# Patient Record
Sex: FEMALE | Race: WHITE | NOT HISPANIC OR LATINO | Employment: FULL TIME | URBAN - METROPOLITAN AREA
[De-identification: names, ages, dates, MRNs, and addresses within clinical notes are randomized per-mention and may not be internally consistent; named-entity substitution may affect disease eponyms.]

---

## 2023-04-03 ENCOUNTER — TELEPHONE (OUTPATIENT)
Dept: PRIMARY CARE | Facility: CLINIC | Age: 63
End: 2023-04-03
Payer: COMMERCIAL

## 2023-04-03 DIAGNOSIS — N30.00 ACUTE CYSTITIS WITHOUT HEMATURIA: Primary | ICD-10-CM

## 2023-04-03 RX ORDER — SULFAMETHOXAZOLE AND TRIMETHOPRIM 800; 160 MG/1; MG/1
1 TABLET ORAL 2 TIMES DAILY
Qty: 20 TABLET | Refills: 0 | Status: SHIPPED | OUTPATIENT
Start: 2023-04-03 | End: 2023-04-13

## 2023-04-03 NOTE — TELEPHONE ENCOUNTER
Having UTI symptoms of burning, urgency with urination, tried to go to urgent care yesterday, they were too full to see her, she went and got an Azo kit and it tested positive for Leukocytes and Nitrites and its helping calm it down but not taking the pain away and her urine is cloudy.. She had to go into work today and would like a script sent to the pharmacy so she can get started on this. Please review and advise, thank you

## 2023-06-28 DIAGNOSIS — Z86.19 HISTORY OF COLD SORES: Primary | ICD-10-CM

## 2023-06-28 PROBLEM — M25.529 JOINT PAIN, ELBOW: Status: RESOLVED | Noted: 2023-06-28 | Resolved: 2023-06-28

## 2023-06-28 PROBLEM — M48.02 STENOSIS, CERVICAL SPINE: Status: ACTIVE | Noted: 2023-06-28

## 2023-06-28 PROBLEM — K29.00 ACUTE GASTRITIS: Status: RESOLVED | Noted: 2023-06-28 | Resolved: 2023-06-28

## 2023-06-28 PROBLEM — M77.00 MEDIAL EPICONDYLITIS OF ELBOW: Status: ACTIVE | Noted: 2023-06-28

## 2023-06-28 PROBLEM — T63.481A ALLERGIC REACTION TO INSECT STING: Status: RESOLVED | Noted: 2023-06-28 | Resolved: 2023-06-28

## 2023-06-28 PROBLEM — G44.309 POST-CONCUSSION HEADACHE: Status: RESOLVED | Noted: 2023-06-28 | Resolved: 2023-06-28

## 2023-06-28 PROBLEM — R30.0 BURNING WITH URINATION: Status: RESOLVED | Noted: 2023-06-28 | Resolved: 2023-06-28

## 2023-06-28 PROBLEM — S76.019A STRAIN OF FLEXOR MUSCLE OF HIP: Status: RESOLVED | Noted: 2023-06-28 | Resolved: 2023-06-28

## 2023-06-28 PROBLEM — M76.01 GLUTEAL TENDINITIS OF RIGHT BUTTOCK: Status: RESOLVED | Noted: 2023-06-28 | Resolved: 2023-06-28

## 2023-06-28 PROBLEM — M79.89 FINGER SWELLING: Status: RESOLVED | Noted: 2023-06-28 | Resolved: 2023-06-28

## 2023-06-28 PROBLEM — H52.4 BILATERAL PRESBYOPIA: Status: ACTIVE | Noted: 2023-06-28

## 2023-06-28 PROBLEM — R10.9 ABDOMINAL PAIN: Status: RESOLVED | Noted: 2023-06-28 | Resolved: 2023-06-28

## 2023-06-28 PROBLEM — M77.11 LATERAL EPICONDYLITIS OF RIGHT ELBOW: Status: RESOLVED | Noted: 2023-06-28 | Resolved: 2023-06-28

## 2023-06-28 PROBLEM — J01.90 SINUSITIS, ACUTE: Status: RESOLVED | Noted: 2023-06-28 | Resolved: 2023-06-28

## 2023-06-28 PROBLEM — L20.9 ATOPIC DERMATITIS: Status: RESOLVED | Noted: 2023-06-28 | Resolved: 2023-06-28

## 2023-06-28 PROBLEM — R94.39 ABNORMAL STRESS ECHOCARDIOGRAM: Status: ACTIVE | Noted: 2023-06-28

## 2023-06-28 PROBLEM — J11.1 INFLUENZA-LIKE SYNDROME: Status: RESOLVED | Noted: 2023-06-28 | Resolved: 2023-06-28

## 2023-06-28 PROBLEM — M77.00 MEDIAL EPICONDYLITIS OF ELBOW: Status: RESOLVED | Noted: 2023-06-28 | Resolved: 2023-06-28

## 2023-06-28 PROBLEM — H52.03 HYPEROPIA OF BOTH EYES: Status: RESOLVED | Noted: 2023-06-28 | Resolved: 2023-06-28

## 2023-06-28 PROBLEM — N31.8 HYPERACTIVITY OF BLADDER: Status: ACTIVE | Noted: 2023-06-28

## 2023-06-28 PROBLEM — M25.551 HIP PAIN, RIGHT: Status: RESOLVED | Noted: 2023-06-28 | Resolved: 2023-06-28

## 2023-06-28 PROBLEM — H02.836 DERMATOCHALASIS OF EYELID OF LEFT EYE: Status: RESOLVED | Noted: 2023-06-28 | Resolved: 2023-06-28

## 2023-06-28 PROBLEM — S06.0XAA CONCUSSION: Status: RESOLVED | Noted: 2023-06-28 | Resolved: 2023-06-28

## 2023-06-28 PROBLEM — M70.61 TROCHANTERIC BURSITIS OF RIGHT HIP: Status: RESOLVED | Noted: 2023-06-28 | Resolved: 2023-06-28

## 2023-06-28 PROBLEM — J20.9 ACUTE BRONCHITIS: Status: RESOLVED | Noted: 2023-06-28 | Resolved: 2023-06-28

## 2023-06-28 PROBLEM — H25.13 CATARACT, NUCLEAR SCLEROTIC, BOTH EYES: Status: ACTIVE | Noted: 2023-06-28

## 2023-06-28 PROBLEM — S50.00XA ELBOW CONTUSION: Status: RESOLVED | Noted: 2023-06-28 | Resolved: 2023-06-28

## 2023-06-28 PROBLEM — R07.89 ATYPICAL CHEST PAIN: Status: ACTIVE | Noted: 2023-06-28

## 2023-06-28 PROBLEM — H81.11 BENIGN PAROXYSMAL POSITIONAL VERTIGO OF RIGHT EAR: Status: RESOLVED | Noted: 2023-06-28 | Resolved: 2023-06-28

## 2023-06-28 PROBLEM — R09.89 LABILE BLOOD PRESSURE: Status: ACTIVE | Noted: 2023-06-28

## 2023-06-28 PROBLEM — H04.123 DRY EYE SYNDROME OF BOTH LACRIMAL GLANDS: Status: ACTIVE | Noted: 2023-06-28

## 2023-06-28 PROBLEM — M54.50 ACUTE BILATERAL LOW BACK PAIN WITHOUT SCIATICA: Status: RESOLVED | Noted: 2023-06-28 | Resolved: 2023-06-28

## 2023-06-28 PROBLEM — R05.3 CHRONIC COUGH: Status: RESOLVED | Noted: 2023-06-28 | Resolved: 2023-06-28

## 2023-06-28 PROBLEM — J45.909 ASTHMATIC BRONCHITIS (HHS-HCC): Status: RESOLVED | Noted: 2023-06-28 | Resolved: 2023-06-28

## 2023-06-28 PROBLEM — J30.9 ALLERGIC RHINITIS: Status: ACTIVE | Noted: 2023-06-28

## 2023-06-28 PROBLEM — K63.5 COLON POLYPS: Status: ACTIVE | Noted: 2023-06-28

## 2023-06-28 PROBLEM — K52.9 GASTROENTERITIS: Status: RESOLVED | Noted: 2023-06-28 | Resolved: 2023-06-28

## 2023-06-28 PROBLEM — R19.7 DIARRHEA: Status: RESOLVED | Noted: 2023-06-28 | Resolved: 2023-06-28

## 2023-06-28 PROBLEM — N39.0 URINARY TRACT INFECTION: Status: RESOLVED | Noted: 2023-06-28 | Resolved: 2023-06-28

## 2023-06-28 PROBLEM — R07.81 RIB PAIN ON LEFT SIDE: Status: RESOLVED | Noted: 2023-06-28 | Resolved: 2023-06-28

## 2023-06-28 PROBLEM — H81.90 VESTIBULAR DISORDER: Status: ACTIVE | Noted: 2023-06-28

## 2023-06-28 PROBLEM — L72.3 SEBACEOUS CYST: Status: ACTIVE | Noted: 2023-06-28

## 2023-06-28 PROBLEM — G57.01 PIRIFORMIS SYNDROME OF RIGHT SIDE: Status: RESOLVED | Noted: 2023-06-28 | Resolved: 2023-06-28

## 2023-06-28 PROBLEM — J06.9 ACUTE UPPER RESPIRATORY INFECTION: Status: RESOLVED | Noted: 2023-06-28 | Resolved: 2023-06-28

## 2023-06-28 PROBLEM — A04.72 C. DIFFICILE COLITIS: Status: RESOLVED | Noted: 2023-06-28 | Resolved: 2023-06-28

## 2023-06-28 PROBLEM — M85.80 OSTEOPENIA: Status: ACTIVE | Noted: 2023-06-28

## 2023-06-28 PROBLEM — K56.7 ILEUS (MULTI): Status: ACTIVE | Noted: 2023-06-28

## 2023-06-28 PROBLEM — M47.812 CERVICAL ARTHRITIS: Status: ACTIVE | Noted: 2023-06-28

## 2023-06-28 PROBLEM — I25.10 CORONARY ARTERY ARTERIOSCLEROSIS: Status: ACTIVE | Noted: 2023-06-28

## 2023-06-28 PROBLEM — K59.00 CONSTIPATION: Status: ACTIVE | Noted: 2023-06-28

## 2023-06-28 RX ORDER — VALACYCLOVIR HYDROCHLORIDE 500 MG/1
1 TABLET, FILM COATED ORAL DAILY
COMMUNITY
Start: 2013-05-02 | End: 2023-06-28 | Stop reason: SDUPTHER

## 2023-06-29 RX ORDER — VALACYCLOVIR HYDROCHLORIDE 500 MG/1
500 TABLET, FILM COATED ORAL DAILY
Qty: 90 TABLET | Refills: 1 | Status: SHIPPED | OUTPATIENT
Start: 2023-06-29 | End: 2023-06-29

## 2023-10-02 ENCOUNTER — LAB (OUTPATIENT)
Dept: LAB | Facility: LAB | Age: 63
End: 2023-10-02
Payer: COMMERCIAL

## 2023-10-02 ENCOUNTER — OFFICE VISIT (OUTPATIENT)
Dept: PRIMARY CARE | Facility: CLINIC | Age: 63
End: 2023-10-02
Payer: COMMERCIAL

## 2023-10-02 ENCOUNTER — PHARMACY VISIT (OUTPATIENT)
Dept: PHARMACY | Facility: CLINIC | Age: 63
End: 2023-10-02
Payer: COMMERCIAL

## 2023-10-02 DIAGNOSIS — A09 DIARRHEA OF INFECTIOUS ORIGIN: ICD-10-CM

## 2023-10-02 DIAGNOSIS — A09 DIARRHEA OF INFECTIOUS ORIGIN: Primary | ICD-10-CM

## 2023-10-02 LAB
ALBUMIN SERPL BCP-MCNC: 4.1 G/DL (ref 3.4–5)
ALP SERPL-CCNC: 74 U/L (ref 33–136)
ALT SERPL W P-5'-P-CCNC: 9 U/L (ref 7–45)
ANION GAP SERPL CALC-SCNC: 15 MMOL/L (ref 10–20)
AST SERPL W P-5'-P-CCNC: 11 U/L (ref 9–39)
BASOPHILS # BLD AUTO: 0.05 X10*3/UL (ref 0–0.1)
BASOPHILS NFR BLD AUTO: 0.5 %
BILIRUB SERPL-MCNC: 0.4 MG/DL (ref 0–1.2)
BUN SERPL-MCNC: 11 MG/DL (ref 6–23)
CALCIUM SERPL-MCNC: 9.1 MG/DL (ref 8.6–10.6)
CHLORIDE SERPL-SCNC: 101 MMOL/L (ref 98–107)
CO2 SERPL-SCNC: 28 MMOL/L (ref 21–32)
CREAT SERPL-MCNC: 0.71 MG/DL (ref 0.5–1.05)
EOSINOPHIL # BLD AUTO: 0.16 X10*3/UL (ref 0–0.7)
EOSINOPHIL NFR BLD AUTO: 1.5 %
ERYTHROCYTE [DISTWIDTH] IN BLOOD BY AUTOMATED COUNT: 11.6 % (ref 11.5–14.5)
GFR SERPL CREATININE-BSD FRML MDRD: >90 ML/MIN/1.73M*2
GLUCOSE SERPL-MCNC: 88 MG/DL (ref 74–99)
HCT VFR BLD AUTO: 42 % (ref 36–46)
HGB BLD-MCNC: 14.3 G/DL (ref 12–16)
IMM GRANULOCYTES # BLD AUTO: 0.04 X10*3/UL (ref 0–0.7)
IMM GRANULOCYTES NFR BLD AUTO: 0.4 % (ref 0–0.9)
LYMPHOCYTES # BLD AUTO: 2.03 X10*3/UL (ref 1.2–4.8)
LYMPHOCYTES NFR BLD AUTO: 19.2 %
MCH RBC QN AUTO: 33 PG (ref 26–34)
MCHC RBC AUTO-ENTMCNC: 34 G/DL (ref 32–36)
MCV RBC AUTO: 97 FL (ref 80–100)
MONOCYTES # BLD AUTO: 1.32 X10*3/UL (ref 0.1–1)
MONOCYTES NFR BLD AUTO: 12.5 %
NEUTROPHILS # BLD AUTO: 6.95 X10*3/UL (ref 1.2–7.7)
NEUTROPHILS NFR BLD AUTO: 65.9 %
NRBC BLD-RTO: 0 /100 WBCS (ref 0–0)
PLATELET # BLD AUTO: 305 X10*3/UL (ref 150–450)
PMV BLD AUTO: 9.8 FL (ref 7.5–11.5)
POTASSIUM SERPL-SCNC: 3.6 MMOL/L (ref 3.5–5.3)
PROT SERPL-MCNC: 6.5 G/DL (ref 6.4–8.2)
RBC # BLD AUTO: 4.33 X10*6/UL (ref 4–5.2)
SODIUM SERPL-SCNC: 140 MMOL/L (ref 136–145)
WBC # BLD AUTO: 10.6 X10*3/UL (ref 4.4–11.3)

## 2023-10-02 PROCEDURE — 99215 OFFICE O/P EST HI 40 MIN: CPT | Performed by: INTERNAL MEDICINE

## 2023-10-02 PROCEDURE — RXMED WILLOW AMBULATORY MEDICATION CHARGE

## 2023-10-02 PROCEDURE — 87635 SARS-COV-2 COVID-19 AMP PRB: CPT

## 2023-10-02 PROCEDURE — 85025 COMPLETE CBC W/AUTO DIFF WBC: CPT

## 2023-10-02 PROCEDURE — 36415 COLL VENOUS BLD VENIPUNCTURE: CPT

## 2023-10-02 PROCEDURE — 80053 COMPREHEN METABOLIC PANEL: CPT

## 2023-10-02 PROCEDURE — 1036F TOBACCO NON-USER: CPT | Performed by: INTERNAL MEDICINE

## 2023-10-02 RX ORDER — VANCOMYCIN HYDROCHLORIDE 125 MG/1
125 CAPSULE ORAL 4 TIMES DAILY
Qty: 40 CAPSULE | Refills: 0 | Status: SHIPPED | OUTPATIENT
Start: 2023-10-02 | End: 2023-10-02 | Stop reason: SDUPTHER

## 2023-10-02 RX ORDER — VANCOMYCIN HYDROCHLORIDE 125 MG/1
125 CAPSULE ORAL 4 TIMES DAILY
Qty: 40 CAPSULE | Refills: 0 | Status: SHIPPED | OUTPATIENT
Start: 2023-10-02 | End: 2023-10-12

## 2023-10-02 NOTE — PROGRESS NOTES
Subjective   Patient ID: Crystal Hall is a 63 y.o. female who presents for Diarrhea.  Diarrhea   Pertinent negatives include no abdominal pain, arthralgias, fever or vomiting.     Covid 19 +   Stool mucous frequent Q 2-4 hours   Pepto bismol   Probiotic   Recent sinus/upper respiratory infection treated with antibiotics  Past C. difficile infection  No abdominal pain although intermittent crampy   Aches diffuse  No fever or emesis ; nausea +      Review of Systems   Constitutional:  Positive for appetite change and fatigue. Negative for fever and unexpected weight change.   HENT:  Negative for rhinorrhea, sore throat, trouble swallowing and voice change.    Respiratory: Negative.     Cardiovascular: Negative.    Gastrointestinal:  Positive for diarrhea and nausea. Negative for abdominal distention, abdominal pain, blood in stool, constipation and vomiting.   Genitourinary:  Negative for dysuria and hematuria.   Musculoskeletal:  Negative for arthralgias.       Objective   Physical Exam  Constitutional:       General: She is not in acute distress.     Appearance: She is ill-appearing.   HENT:      Mouth/Throat:      Mouth: Mucous membranes are moist.      Pharynx: Oropharynx is clear. No posterior oropharyngeal erythema.   Eyes:      General: No scleral icterus.  Cardiovascular:      Rate and Rhythm: Normal rate and regular rhythm.      Heart sounds: Normal heart sounds.   Pulmonary:      Effort: Pulmonary effort is normal.      Breath sounds: Normal breath sounds.   Abdominal:      General: Abdomen is flat. Bowel sounds are normal. There is no distension.      Palpations: Abdomen is soft. There is no mass.      Tenderness: There is abdominal tenderness. There is no right CVA tenderness, left CVA tenderness, guarding or rebound.      Hernia: No hernia is present.   Musculoskeletal:      Cervical back: Neck supple.   Lymphadenopathy:      Cervical: No cervical adenopathy.   Neurological:      General: No focal  deficit present.      Mental Status: She is alert.      Gait: Gait normal.     /74   Pulse 84   Temp 36.8 °C (98.2 °F)   Resp 16       Assessment/Plan     Diarrhea likely of infectious origin  Potential for COVID-19 infection or recurrent C. difficile colitis following recent treatment for sinusitis with antibiotics  Test for both  Start antibiotic therapy vancomycin if appropriate for C. difficile  Symptomatic relief reviewed with antimotility agent, adequate fluid intake, fiber supplements  Off-duty for 1 week  Problem List Items Addressed This Visit    None  Visit Diagnoses       Diarrhea of infectious origin    -  Primary    Relevant Orders    Sars-CoV-2 PCR, Symptomatic (Completed)    C. difficile, PCR    CBC and Auto Differential (Completed)    Comprehensive metabolic panel (Completed)

## 2023-10-03 ENCOUNTER — TELEPHONE (OUTPATIENT)
Dept: PRIMARY CARE | Facility: CLINIC | Age: 63
End: 2023-10-03
Payer: COMMERCIAL

## 2023-10-03 DIAGNOSIS — U07.1 COVID-19 VIRUS INFECTION: Primary | ICD-10-CM

## 2023-10-03 LAB — SARS-COV-2 RNA RESP QL NAA+PROBE: DETECTED

## 2023-10-03 RX ORDER — LORATADINE AND PSEUDOEPHEDRINE SULFATE 5; 120 MG/1; MG/1
1 TABLET, EXTENDED RELEASE ORAL EVERY 12 HOURS PRN
COMMUNITY
Start: 2015-01-13

## 2023-10-03 RX ORDER — DOXYCYCLINE 100 MG/1
100 CAPSULE ORAL EVERY 12 HOURS
COMMUNITY
Start: 2022-07-15 | End: 2024-03-28 | Stop reason: ALTCHOICE

## 2023-10-03 RX ORDER — METRONIDAZOLE 500 MG/1
500 TABLET ORAL 3 TIMES DAILY
COMMUNITY
Start: 2022-01-28 | End: 2024-03-28 | Stop reason: ALTCHOICE

## 2023-10-03 RX ORDER — CRISABOROLE 20 MG/G
1 OINTMENT TOPICAL 2 TIMES DAILY
COMMUNITY
Start: 2018-05-17 | End: 2024-03-28 | Stop reason: ALTCHOICE

## 2023-10-03 RX ORDER — SULFAMETHOXAZOLE AND TRIMETHOPRIM 800; 160 MG/1; MG/1
1 TABLET ORAL 2 TIMES DAILY
COMMUNITY
Start: 2023-04-03

## 2023-10-03 NOTE — TELEPHONE ENCOUNTER
After speaking with her she stated that her diarrhea ceased after she left [facility] yesterday and has not started the vancomyocin yet and had not have the samples she would need for the c diff test. She does have the vanco but now unsure she should take it because of no diarrhea. She was able to eat some things last night She saw the result and the need for the paxlovid but she is also worried as to when she can return to work as she has had Covid over 10 days agao and isn't sure if thei is a rebound thing. Please advise, on what to do going forward, thankyou

## 2023-10-22 VITALS
DIASTOLIC BLOOD PRESSURE: 74 MMHG | HEART RATE: 84 BPM | TEMPERATURE: 98.2 F | RESPIRATION RATE: 16 BRPM | SYSTOLIC BLOOD PRESSURE: 116 MMHG

## 2023-10-22 ASSESSMENT — ENCOUNTER SYMPTOMS
FATIGUE: 1
VOMITING: 0
UNEXPECTED WEIGHT CHANGE: 0
NAUSEA: 1
ABDOMINAL PAIN: 0
DYSURIA: 0
BLOOD IN STOOL: 0
FEVER: 0
APPETITE CHANGE: 1
CONSTIPATION: 0
DIARRHEA: 1
VOICE CHANGE: 0
CARDIOVASCULAR NEGATIVE: 1
SORE THROAT: 0
ABDOMINAL DISTENTION: 0
TROUBLE SWALLOWING: 0
RHINORRHEA: 0
RESPIRATORY NEGATIVE: 1
HEMATURIA: 0
ARTHRALGIAS: 0

## 2023-12-19 DIAGNOSIS — N31.8 HYPERACTIVITY OF BLADDER: Primary | ICD-10-CM

## 2023-12-19 DIAGNOSIS — J30.9 ALLERGIC RHINITIS, UNSPECIFIED SEASONALITY, UNSPECIFIED TRIGGER: ICD-10-CM

## 2023-12-19 DIAGNOSIS — Z86.19 HISTORY OF COLD SORES: ICD-10-CM

## 2023-12-19 PROCEDURE — RXMED WILLOW AMBULATORY MEDICATION CHARGE

## 2023-12-19 RX ORDER — TOLTERODINE 4 MG/1
4 CAPSULE, EXTENDED RELEASE ORAL DAILY
Qty: 90 CAPSULE | Refills: 3 | Status: SHIPPED | OUTPATIENT
Start: 2023-12-19 | End: 2024-12-17

## 2023-12-19 RX ORDER — VALACYCLOVIR HYDROCHLORIDE 500 MG/1
500 TABLET, FILM COATED ORAL
Qty: 90 TABLET | Refills: 1 | Status: SHIPPED | OUTPATIENT
Start: 2023-12-19 | End: 2024-05-20 | Stop reason: SDUPTHER

## 2023-12-19 RX ORDER — MONTELUKAST SODIUM 10 MG/1
10 TABLET ORAL DAILY
Qty: 90 TABLET | Refills: 3 | Status: SHIPPED | OUTPATIENT
Start: 2023-12-19 | End: 2024-12-17

## 2023-12-20 PROCEDURE — RXMED WILLOW AMBULATORY MEDICATION CHARGE

## 2023-12-21 ENCOUNTER — PHARMACY VISIT (OUTPATIENT)
Dept: PHARMACY | Facility: CLINIC | Age: 63
End: 2023-12-21
Payer: COMMERCIAL

## 2024-03-14 PROCEDURE — RXMED WILLOW AMBULATORY MEDICATION CHARGE

## 2024-03-15 ENCOUNTER — PHARMACY VISIT (OUTPATIENT)
Dept: PHARMACY | Facility: CLINIC | Age: 64
End: 2024-03-15
Payer: COMMERCIAL

## 2024-03-27 ENCOUNTER — OFFICE VISIT (OUTPATIENT)
Dept: PRIMARY CARE | Facility: CLINIC | Age: 64
End: 2024-03-27
Payer: COMMERCIAL

## 2024-03-27 ENCOUNTER — PATIENT MESSAGE (OUTPATIENT)
Dept: PRIMARY CARE | Facility: CLINIC | Age: 64
End: 2024-03-27

## 2024-03-27 DIAGNOSIS — J45.20: Primary | ICD-10-CM

## 2024-03-27 PROCEDURE — 99214 OFFICE O/P EST MOD 30 MIN: CPT | Performed by: INTERNAL MEDICINE

## 2024-03-27 ASSESSMENT — ENCOUNTER SYMPTOMS
SPUTUM PRODUCTION: 1
RHINORRHEA: 1
ORTHOPNEA: 1
SHORTNESS OF BREATH: 1
WHEEZING: 1

## 2024-03-27 NOTE — PROGRESS NOTES
Subjective   Patient ID: Crystal Hall is a 63 y.o. female who presents for Cough.  Shortness of Breath  This is a recurrent problem. The current episode started 1 to 4 weeks ago. The problem occurs daily. The problem has been waxing and waning. The average episode lasts 1 hours. Associated symptoms include orthopnea, rhinorrhea, sputum production and wheezing. Pertinent negatives include no sore throat. The symptoms are aggravated by smoke, exercise, URIs, weather changes and eating.     Cough postnasal drip allergy med C-D Flonase   Wheezing, chest congestion  Align probiotic bowel habits stable  No swallowing chewing   Cold symptoms  Fever none   Shortness of breath 1 week  advair some help     Review of Systems   HENT:  Positive for rhinorrhea. Negative for sore throat, trouble swallowing and voice change.    Respiratory:  Positive for sputum production, shortness of breath and wheezing.    Cardiovascular:  Positive for orthopnea.   Gastrointestinal:  Negative for diarrhea.       Objective   Physical Exam  Constitutional:       General: She is not in acute distress.  HENT:      Nose: Congestion present.      Mouth/Throat:      Pharynx: Oropharynx is clear.   Neck:      Comments: No JVD or accessory muscle respiration use  Cardiovascular:      Rate and Rhythm: Normal rate and regular rhythm.      Pulses: Normal pulses.      Heart sounds: Normal heart sounds.   Pulmonary:      Effort: Pulmonary effort is normal.      Breath sounds: Rhonchi present. No wheezing or rales.      Comments: Occasional rhonchi  Musculoskeletal:      Right lower leg: No edema.      Left lower leg: No edema.   Lymphadenopathy:      Cervical: No cervical adenopathy.   Neurological:      Mental Status: She is alert.      Gait: Gait normal.       /74   Pulse 76   Resp 16     Data  Chest x-ray 9/9/2022 no acute disease  COVID-19 + 10/2/2023    Assessment/Plan     Reactive airways disease with cough/bronchospasm  Reviewed diagnosis  and therapeutic options  Recommend albuterol metered-dose inhaler for short-term relief bronchospasm and short course oral steroid therapy for anti-inflammatory effect  Prior history of COVID-19 infection  Prior C. difficile infection noted, defer empiric antibiotic therapy  Problem List Items Addressed This Visit    None  Visit Diagnoses       Reactive airway disease with wheezing, mild intermittent, uncomplicated ([facility])    -  Primary    Relevant Medications    albuterol (Ventolin HFA) 90 mcg/actuation inhaler    methylPREDNISolone (Medrol Dospak) 4 mg tablets

## 2024-03-28 ENCOUNTER — PHARMACY VISIT (OUTPATIENT)
Dept: PHARMACY | Facility: CLINIC | Age: 64
End: 2024-03-28
Payer: COMMERCIAL

## 2024-03-28 ENCOUNTER — PATIENT MESSAGE (OUTPATIENT)
Dept: PRIMARY CARE | Facility: CLINIC | Age: 64
End: 2024-03-28
Payer: COMMERCIAL

## 2024-03-28 DIAGNOSIS — J45.41 MODERATE PERSISTENT ASTHMATIC BRONCHITIS WITH ACUTE EXACERBATION (HHS-HCC): Primary | ICD-10-CM

## 2024-03-28 PROCEDURE — RXMED WILLOW AMBULATORY MEDICATION CHARGE

## 2024-03-28 RX ORDER — ALBUTEROL SULFATE 90 UG/1
AEROSOL, METERED RESPIRATORY (INHALATION)
Qty: 18 G | Refills: 0 | Status: SHIPPED | OUTPATIENT
Start: 2024-03-28 | End: 2024-05-08 | Stop reason: SDUPTHER

## 2024-03-28 RX ORDER — PREDNISONE 10 MG/1
TABLET ORAL
Qty: 42 TABLET | Refills: 0 | Status: SHIPPED | OUTPATIENT
Start: 2024-03-28 | End: 2024-04-09

## 2024-04-13 VITALS — DIASTOLIC BLOOD PRESSURE: 74 MMHG | SYSTOLIC BLOOD PRESSURE: 116 MMHG | HEART RATE: 76 BPM | RESPIRATION RATE: 16 BRPM

## 2024-04-13 RX ORDER — METHYLPREDNISOLONE 4 MG/1
TABLET ORAL
Qty: 21 TABLET | Refills: 0 | Status: SHIPPED | OUTPATIENT
Start: 2024-04-13

## 2024-04-13 RX ORDER — ALBUTEROL SULFATE 90 UG/1
2 AEROSOL, METERED RESPIRATORY (INHALATION) EVERY 4 HOURS PRN
Qty: 8 G | Refills: 5 | Status: SHIPPED | OUTPATIENT
Start: 2024-04-13 | End: 2025-04-13

## 2024-04-13 ASSESSMENT — ENCOUNTER SYMPTOMS
VOICE CHANGE: 0
TROUBLE SWALLOWING: 0
DIARRHEA: 0
ORTHOPNEA: 1
SHORTNESS OF BREATH: 1
SORE THROAT: 0
RHINORRHEA: 1
SPUTUM PRODUCTION: 1
WHEEZING: 1

## 2024-04-24 ENCOUNTER — OFFICE VISIT (OUTPATIENT)
Dept: OBSTETRICS AND GYNECOLOGY | Facility: CLINIC | Age: 64
End: 2024-04-24
Payer: COMMERCIAL

## 2024-04-24 VITALS
SYSTOLIC BLOOD PRESSURE: 112 MMHG | BODY MASS INDEX: 22.39 KG/M2 | WEIGHT: 134.4 LBS | HEIGHT: 65 IN | DIASTOLIC BLOOD PRESSURE: 76 MMHG

## 2024-04-24 DIAGNOSIS — Z12.11 SCREENING FOR COLON CANCER: ICD-10-CM

## 2024-04-24 DIAGNOSIS — Z12.31 VISIT FOR SCREENING MAMMOGRAM: Primary | ICD-10-CM

## 2024-04-24 DIAGNOSIS — Z01.419 ENCOUNTER FOR ANNUAL ROUTINE GYNECOLOGICAL EXAMINATION: ICD-10-CM

## 2024-04-24 DIAGNOSIS — N95.0 POST-MENOPAUSAL BLEEDING: ICD-10-CM

## 2024-04-24 DIAGNOSIS — M85.80 OSTEOPENIA, UNSPECIFIED LOCATION: ICD-10-CM

## 2024-04-24 PROCEDURE — 1036F TOBACCO NON-USER: CPT | Performed by: OBSTETRICS & GYNECOLOGY

## 2024-04-24 PROCEDURE — 99396 PREV VISIT EST AGE 40-64: CPT | Performed by: OBSTETRICS & GYNECOLOGY

## 2024-04-24 ASSESSMENT — PAIN SCALES - GENERAL: PAINLEVEL: 1

## 2024-04-24 ASSESSMENT — ENCOUNTER SYMPTOMS
RESPIRATORY NEGATIVE: 0
PSYCHIATRIC NEGATIVE: 0
EYES NEGATIVE: 0
ALLERGIC/IMMUNOLOGIC NEGATIVE: 0
CONSTITUTIONAL NEGATIVE: 0
MUSCULOSKELETAL NEGATIVE: 0
NEUROLOGICAL NEGATIVE: 0
CARDIOVASCULAR NEGATIVE: 0
ENDOCRINE NEGATIVE: 0
HEMATOLOGIC/LYMPHATIC NEGATIVE: 0
GASTROINTESTINAL NEGATIVE: 0

## 2024-04-24 NOTE — PROGRESS NOTES
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     She reports a rare episode of spotting not related to a intercourse that may occur every 3 to 4 months.  She has some vaginal dryness that is remedied with lubricants.    She has a history of colon polyps.  Her last colonoscopy was in 2019.    Osteopenia was noted on her 2019 DEXA scan.    She reports good bladder control with Detrol.    Subjective   Patient ID: Crystal Hall is a 63 y.o. female who presents for Annual Exam.  HPI    Review of Systems    Objective   Physical Exam  Exam conducted with a chaperone present.   Constitutional:       Appearance: She is normal weight.   HENT:      Head: Normocephalic.      Right Ear: External ear normal.      Left Ear: External ear normal.      Nose: Nose normal.      Mouth/Throat:      Mouth: Mucous membranes are moist.   Eyes:      Extraocular Movements: Extraocular movements intact.      Pupils: Pupils are equal, round, and reactive to light.   Cardiovascular:      Rate and Rhythm: Normal rate and regular rhythm.      Heart sounds: Normal heart sounds.   Pulmonary:      Effort: Pulmonary effort is normal.      Breath sounds: Normal breath sounds.   Chest:   Breasts:     Right: Normal.      Left: Normal.      Comments: fibrocystic  Abdominal:      Palpations: Abdomen is soft.   Genitourinary:     General: Normal vulva.      Labia:         Right: No rash or lesion.         Left: No rash or lesion.       Vagina: Normal.      Cervix: Normal. No cervical motion tenderness.      Uterus: Normal. Not fixed and not tender.       Adnexa: Right adnexa normal and left adnexa normal.      Comments: atrophic  Musculoskeletal:         General: Normal range of motion.      Cervical back: Normal range of motion and neck supple.   Skin:     General: Skin is warm and dry.   Neurological:      General: No focal deficit present.      Mental Status: She is alert and oriented to person, place, and  time.   Psychiatric:         Mood and Affect: Mood normal.         Behavior: Behavior normal.     A/P: APE     -  Pap due in 2025    -  Mammogram     -  PCP F/U     -  DEXA Q 2-3 years    -  Colonoscopy     PMB    -  US     -  Lube samples, ? If 2/2 atrophy

## 2024-04-24 NOTE — PATIENT INSTRUCTIONS
Thanks for coming in today for your annual GYN exam.      Your 2023 Pap was within normal limits with negative HPV testing.  Your next Pap smear is due in 2026.  However, please return to the office once a year for your annual GYN exam.      Arrange to have a mammogram performed once a year.      Arrange to have an ultrasound performed to help evaluate postmenopausal spotting.    Review the information about vulvovaginal care.      Arrange to have a DEXA/bone density scan performed every 2 to 3 years.      Follow-up with your PCP and other healthcare specialist as needed.      Feel free to call the office with any problems, questions or concerns prior to next scheduled visit.

## 2024-05-02 ENCOUNTER — TELEPHONE (OUTPATIENT)
Dept: GASTROENTEROLOGY | Facility: CLINIC | Age: 64
End: 2024-05-02
Payer: COMMERCIAL

## 2024-05-02 DIAGNOSIS — Z12.11 SPECIAL SCREENING FOR MALIGNANT NEOPLASMS, COLON: ICD-10-CM

## 2024-05-02 PROCEDURE — RXMED WILLOW AMBULATORY MEDICATION CHARGE

## 2024-05-02 RX ORDER — POLYETHYLENE GLYCOL 3350, SODIUM SULFATE ANHYDROUS, SODIUM BICARBONATE, SODIUM CHLORIDE, POTASSIUM CHLORIDE 236; 22.74; 6.74; 5.86; 2.97 G/4L; G/4L; G/4L; G/4L; G/4L
POWDER, FOR SOLUTION ORAL
Qty: 4000 ML | Refills: 0 | Status: SHIPPED | OUTPATIENT
Start: 2024-05-02

## 2024-05-08 ENCOUNTER — PHARMACY VISIT (OUTPATIENT)
Dept: PHARMACY | Facility: CLINIC | Age: 64
End: 2024-05-08
Payer: COMMERCIAL

## 2024-05-08 DIAGNOSIS — J45.41 MODERATE PERSISTENT ASTHMATIC BRONCHITIS WITH ACUTE EXACERBATION (HHS-HCC): ICD-10-CM

## 2024-05-08 PROCEDURE — RXMED WILLOW AMBULATORY MEDICATION CHARGE

## 2024-05-08 RX ORDER — ALBUTEROL SULFATE 90 UG/1
AEROSOL, METERED RESPIRATORY (INHALATION)
Qty: 18 G | Refills: 2 | Status: SHIPPED | OUTPATIENT
Start: 2024-05-08 | End: 2025-05-08

## 2024-05-10 ENCOUNTER — PHARMACY VISIT (OUTPATIENT)
Dept: PHARMACY | Facility: CLINIC | Age: 64
End: 2024-05-10
Payer: COMMERCIAL

## 2024-05-20 ENCOUNTER — HOSPITAL ENCOUNTER (OUTPATIENT)
Dept: RADIOLOGY | Facility: HOSPITAL | Age: 64
Discharge: HOME | End: 2024-05-20
Payer: COMMERCIAL

## 2024-05-20 VITALS — WEIGHT: 130 LBS | BODY MASS INDEX: 21.66 KG/M2 | HEIGHT: 65 IN

## 2024-05-20 DIAGNOSIS — Z12.31 VISIT FOR SCREENING MAMMOGRAM: ICD-10-CM

## 2024-05-20 DIAGNOSIS — Z86.19 HISTORY OF COLD SORES: ICD-10-CM

## 2024-05-20 DIAGNOSIS — N95.0 POST-MENOPAUSAL BLEEDING: ICD-10-CM

## 2024-05-20 PROCEDURE — 77067 SCR MAMMO BI INCL CAD: CPT | Performed by: RADIOLOGY

## 2024-05-20 PROCEDURE — 76856 US EXAM PELVIC COMPLETE: CPT | Performed by: RADIOLOGY

## 2024-05-20 PROCEDURE — 77067 SCR MAMMO BI INCL CAD: CPT

## 2024-05-20 PROCEDURE — RXMED WILLOW AMBULATORY MEDICATION CHARGE

## 2024-05-20 PROCEDURE — 77063 BREAST TOMOSYNTHESIS BI: CPT | Performed by: RADIOLOGY

## 2024-05-20 PROCEDURE — 76830 TRANSVAGINAL US NON-OB: CPT | Performed by: RADIOLOGY

## 2024-05-20 PROCEDURE — 76856 US EXAM PELVIC COMPLETE: CPT

## 2024-05-20 RX ORDER — VALACYCLOVIR HYDROCHLORIDE 500 MG/1
500 TABLET, FILM COATED ORAL
Qty: 90 TABLET | Refills: 1 | Status: SHIPPED | OUTPATIENT
Start: 2024-05-20 | End: 2025-05-20

## 2024-05-23 ENCOUNTER — PHARMACY VISIT (OUTPATIENT)
Dept: PHARMACY | Facility: CLINIC | Age: 64
End: 2024-05-23
Payer: COMMERCIAL

## 2024-05-24 ENCOUNTER — TELEPHONE (OUTPATIENT)
Dept: OBSTETRICS AND GYNECOLOGY | Facility: CLINIC | Age: 64
End: 2024-05-24
Payer: COMMERCIAL

## 2024-05-24 NOTE — TELEPHONE ENCOUNTER
----- Message from Cici Zapata MD sent at 5/24/2024  2:19 PM EDT -----  Normal mammogram, however dense breast.  The patient may want to consider a fast MRI which will cost $250.

## 2024-05-24 NOTE — TELEPHONE ENCOUNTER
----- Message from Cici Zapata MD sent at 5/24/2024  2:22 PM EDT -----  PMB on occasion, US with fibroids and a normal endometrial stripe and stable ovarian cyst - however, the patient has a h/o SONU exposure. RTC for follow up/possible EMBx.

## 2024-05-24 NOTE — TELEPHONE ENCOUNTER
Contacted pt and verified name and .  Pt notified of her US and Mamm results, pt advised to come in to discuss Endobx procedure. Pt scheduled for 24 at 11:15 am at [facility].  Pt states understanding and denies having questions at this time.

## 2024-05-24 NOTE — TELEPHONE ENCOUNTER
Contacted pt and verified name and .  Pt notified of her US and Mamm results, pt advised to come in to discuss Endobx procedure. Pt scheduled for 24 at 11:15 am at [facility].  Pt states understanding and denies having questions at this time.

## 2024-05-24 NOTE — TELEPHONE ENCOUNTER
Attempted to call pt for mammogram and US results.  Pt did not answer, left VM to return call to clinic.;

## 2024-06-24 ENCOUNTER — APPOINTMENT (OUTPATIENT)
Dept: GASTROENTEROLOGY | Facility: EXTERNAL LOCATION | Age: 64
End: 2024-06-24
Payer: COMMERCIAL

## 2024-06-24 DIAGNOSIS — Z12.11 SCREENING FOR COLON CANCER: ICD-10-CM

## 2024-06-24 DIAGNOSIS — Z86.010 PERSONAL HISTORY OF COLONIC POLYPS: Primary | ICD-10-CM

## 2024-06-24 DIAGNOSIS — D12.8 BENIGN NEOPLASM OF RECTUM: ICD-10-CM

## 2024-06-24 DIAGNOSIS — K55.20 ANGIODYSPLASIA OF COLON WITHOUT HEMORRHAGE: ICD-10-CM

## 2024-06-24 DIAGNOSIS — D12.5 BENIGN NEOPLASM OF SIGMOID COLON: ICD-10-CM

## 2024-06-24 DIAGNOSIS — D12.3 BENIGN NEOPLASM OF TRANSVERSE COLON: ICD-10-CM

## 2024-06-24 DIAGNOSIS — K64.8 OTHER HEMORRHOIDS: ICD-10-CM

## 2024-06-24 DIAGNOSIS — D12.4 BENIGN NEOPLASM OF DESCENDING COLON: ICD-10-CM

## 2024-06-24 PROCEDURE — 88305 TISSUE EXAM BY PATHOLOGIST: CPT | Performed by: PATHOLOGY

## 2024-06-24 PROCEDURE — 45385 COLONOSCOPY W/LESION REMOVAL: CPT | Performed by: INTERNAL MEDICINE

## 2024-06-24 PROCEDURE — 88305 TISSUE EXAM BY PATHOLOGIST: CPT

## 2024-06-26 ENCOUNTER — LAB REQUISITION (OUTPATIENT)
Dept: LAB | Facility: HOSPITAL | Age: 64
End: 2024-06-26
Payer: COMMERCIAL

## 2024-07-09 LAB
LABORATORY COMMENT REPORT: NORMAL
PATH REPORT.FINAL DX SPEC: NORMAL
PATH REPORT.GROSS SPEC: NORMAL
PATH REPORT.RELEVANT HX SPEC: NORMAL
PATH REPORT.TOTAL CANCER: NORMAL
RESIDENT REVIEW: NORMAL

## 2024-07-31 ENCOUNTER — APPOINTMENT (OUTPATIENT)
Dept: OPHTHALMOLOGY | Facility: CLINIC | Age: 64
End: 2024-07-31
Payer: COMMERCIAL

## 2024-08-07 ENCOUNTER — APPOINTMENT (OUTPATIENT)
Dept: OBSTETRICS AND GYNECOLOGY | Facility: CLINIC | Age: 64
End: 2024-08-07
Payer: COMMERCIAL

## 2024-08-07 ENCOUNTER — TELEPHONE (OUTPATIENT)
Dept: OBSTETRICS AND GYNECOLOGY | Facility: CLINIC | Age: 64
End: 2024-08-07
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
## 2024-09-09 PROCEDURE — RXMED WILLOW AMBULATORY MEDICATION CHARGE

## 2024-09-11 ENCOUNTER — PHARMACY VISIT (OUTPATIENT)
Dept: PHARMACY | Facility: CLINIC | Age: 64
End: 2024-09-11
Payer: COMMERCIAL

## 2024-09-26 ENCOUNTER — APPOINTMENT (OUTPATIENT)
Dept: OBSTETRICS AND GYNECOLOGY | Facility: CLINIC | Age: 64
End: 2024-09-26
Payer: COMMERCIAL

## 2024-09-26 VITALS
DIASTOLIC BLOOD PRESSURE: 82 MMHG | BODY MASS INDEX: 22.23 KG/M2 | WEIGHT: 133.4 LBS | HEIGHT: 65 IN | SYSTOLIC BLOOD PRESSURE: 130 MMHG

## 2024-09-26 DIAGNOSIS — N95.0 POST-MENOPAUSAL BLEEDING: Primary | ICD-10-CM

## 2024-09-26 PROCEDURE — 1159F MED LIST DOCD IN RCRD: CPT | Performed by: OBSTETRICS & GYNECOLOGY

## 2024-09-26 PROCEDURE — 3008F BODY MASS INDEX DOCD: CPT | Performed by: OBSTETRICS & GYNECOLOGY

## 2024-09-26 PROCEDURE — 99213 OFFICE O/P EST LOW 20 MIN: CPT | Performed by: OBSTETRICS & GYNECOLOGY

## 2024-09-26 ASSESSMENT — ENCOUNTER SYMPTOMS
NEUROLOGICAL NEGATIVE: 0
CARDIOVASCULAR NEGATIVE: 0
PSYCHIATRIC NEGATIVE: 0
RESPIRATORY NEGATIVE: 0
HEMATOLOGIC/LYMPHATIC NEGATIVE: 0
GASTROINTESTINAL NEGATIVE: 0
MUSCULOSKELETAL NEGATIVE: 0
ENDOCRINE NEGATIVE: 0
EYES NEGATIVE: 0
ALLERGIC/IMMUNOLOGIC NEGATIVE: 0
CONSTITUTIONAL NEGATIVE: 0

## 2024-09-26 NOTE — PROGRESS NOTES
64-year-old G0 postmenopausal  woman presents today for follow-up.  She has a history of in utero SONU exposure.  She reports rare episodes of spotting not related to sexual intercourse and she has not had any recent spotting.    A May 2024 ultrasound showed a 6.6 cm multiple fibroid uterus with endometrial stripe of 0.3 cm.  Bilateral stable cystic lesions were seen in the ovaries.    O: WDWN woman in NAD, A&O x3    A/P: are PMB/spotting and none recently with normal endometrial stripe    -  D/W the patient R/B/A of EMBx     -  Shared decision making, she has decided to observe for now    -  Repeat US in 6 months    -  RTC for EMBx with any additional VB

## 2024-09-26 NOTE — PATIENT INSTRUCTIONS
Thanks for coming in today for follow-up.      Arrange to have a 6 month follow-up ultrasound performed to recheck endometrial lining.      Feel free to call the office with any further vaginal bleeding/spotting.      Return to the office for your annual GYN exam or as needed.

## 2024-11-13 ENCOUNTER — OFFICE VISIT (OUTPATIENT)
Dept: OPHTHALMOLOGY | Facility: CLINIC | Age: 64
End: 2024-11-13
Payer: COMMERCIAL

## 2024-11-13 DIAGNOSIS — H02.831 DERMATOCHALASIS OF BOTH UPPER EYELIDS: ICD-10-CM

## 2024-11-13 DIAGNOSIS — H02.834 DERMATOCHALASIS OF BOTH UPPER EYELIDS: ICD-10-CM

## 2024-11-13 DIAGNOSIS — H25.13 NUCLEAR SCLEROTIC CATARACT OF BOTH EYES: ICD-10-CM

## 2024-11-13 DIAGNOSIS — H52.03 HYPERMETROPIA OF BOTH EYES: Primary | ICD-10-CM

## 2024-11-13 DIAGNOSIS — H52.4 PRESBYOPIA: ICD-10-CM

## 2024-11-13 PROCEDURE — 92014 COMPRE OPH EXAM EST PT 1/>: CPT | Performed by: OPTOMETRIST

## 2024-11-13 PROCEDURE — FLVLF CONTACT LENS EVALUATION (SP): Performed by: OPTOMETRIST

## 2024-11-13 PROCEDURE — 92015 DETERMINE REFRACTIVE STATE: CPT | Performed by: OPTOMETRIST

## 2024-11-13 ASSESSMENT — VISUAL ACUITY
OD_PH_SC: 20/30
OS_PH_SC: 20/40
VA_OR_OD_CURRENT_RX: 20/40-1
OD_PH_SC+: -2
OS_PH_SC+: -1
OS_SC: 20/150
METHOD: SNELLEN - LINEAR
VA_OR_OS_CURRENT_RX: 20/30-2
OD_SC: 20/100

## 2024-11-13 ASSESSMENT — CONF VISUAL FIELD
OS_NORMAL: 1
OS_INFERIOR_NASAL_RESTRICTION: 0
OS_INFERIOR_TEMPORAL_RESTRICTION: 0
OD_NORMAL: 1
OD_INFERIOR_TEMPORAL_RESTRICTION: 0
OD_SUPERIOR_NASAL_RESTRICTION: 0
METHOD: COUNTING FINGERS
OS_SUPERIOR_TEMPORAL_RESTRICTION: 0
OS_SUPERIOR_NASAL_RESTRICTION: 0
OD_SUPERIOR_TEMPORAL_RESTRICTION: 0
OD_INFERIOR_NASAL_RESTRICTION: 0

## 2024-11-13 ASSESSMENT — REFRACTION_CURRENTRX
OS_DIAMETER: 14.3
OD_BASECURVE: 8.4
OD_SPHERE: +1.50
OD_BRAND: NATURALVUE MF 1 DAY
OD_BRAND: ACUVUE OASYS MAX 1 DAY MF
OS_BASECURVE: 8.3
OS_CYLINDER: SPHERE
OD_ADD: MID
OD_DIAMETER: 14.5
OS_DIAMETER: 14.5
OD_BASECURVE: 8.3
OS_DIAMETER: 14.1
OS_BRAND: NATURALVUE MF 1 DAY
OS_SPHERE: +1.75
OD_SPHERE: +1.50
OD_DIAMETER: 14.1
OD_CYLINDER: SPHERE
OS_SPHERE: +2.00
OS_ADD: HIGH
OS_BASECURVE: 8.5
OS_BRAND: DAILIES TOTAL 1 MF
OD_SPHERE: +1.25
OS_BASECURVE: 8.4
OS_SPHERE: +2.00
OD_BASECURVE: 8.5
OS_BRAND: ACUVUE OASYS MAX 1 DAY MF
OD_DIAMETER: 14.3
OD_BRAND: DAILIES TOTAL 1 MF

## 2024-11-13 ASSESSMENT — ENCOUNTER SYMPTOMS
GASTROINTESTINAL NEGATIVE: 0
CONSTITUTIONAL NEGATIVE: 0
NEUROLOGICAL NEGATIVE: 0
ENDOCRINE NEGATIVE: 0
PSYCHIATRIC NEGATIVE: 0
RESPIRATORY NEGATIVE: 0
ALLERGIC/IMMUNOLOGIC NEGATIVE: 0
EYES NEGATIVE: 0
MUSCULOSKELETAL NEGATIVE: 0
CARDIOVASCULAR NEGATIVE: 0
HEMATOLOGIC/LYMPHATIC NEGATIVE: 0

## 2024-11-13 ASSESSMENT — REFRACTION_MANIFEST
OS_SPHERE: +1.75
OD_ADD: +2.75
OD_CYLINDER: SPHERE
OD_SPHERE: +1.75
OS_ADD: +2.75
OS_CYLINDER: SPHERE

## 2024-11-13 ASSESSMENT — TONOMETRY
OS_IOP_MMHG: 12
OD_IOP_MMHG: 13
IOP_METHOD: GOLDMANN APPLANATION

## 2024-11-13 ASSESSMENT — EXTERNAL EXAM - RIGHT EYE: OD_EXAM: NORMAL

## 2024-11-13 ASSESSMENT — REFRACTION
OD_ADD: +2.75
OS_ADD: +2.75
OS_SPHERE: +2.00
OD_CYLINDER: SPHERE
OD_SPHERE: +2.00
OS_CYLINDER: SPHERE

## 2024-11-13 ASSESSMENT — CUP TO DISC RATIO
OD_RATIO: .4
OS_RATIO: .5

## 2024-11-13 ASSESSMENT — EXTERNAL EXAM - LEFT EYE: OS_EXAM: NORMAL

## 2024-11-13 NOTE — PROGRESS NOTES
Assessment/Plan   Diagnoses and all orders for this visit:  Hypermetropia of both eyes  Presbyopia  New spec rx released today per patient request. Ocular health wnl for age OU. Monitor 1 year or sooner prn. Refraction billed today. Pt consents to receiving glasses Rx today. Patient's/guardian's signature obtained to acknowledge and confirm that a paper copy of glasses Rx was given to patient in compliance with [facility] Eyeglass Rule. Electronic copy of Rx will also be available via [employer].   Discussed proper wear, care, replacement of contact lenses. Gave handout. D/c cl wear and RTC if eyes become red, painful, irritated. Monitor 1 year.   CL eval billed today. $35  Will order trials of Max 1Day MF to compare w/ NaturalVue MF CL. Pt to RTC for check on 12/18 w/ preferred CLs in for CL check, if happy with either brand can finalize.    Nuclear sclerotic cataract of both eyes  Patient's cataracts are not visually significant. Will monitor for changes. No indication for surgery at this time.    Dermatochalasis of both upper eyelids  Consult Dr. Benavides prn.

## 2024-11-13 NOTE — Clinical Note
Both eyes (OU) Contact Lens Order   Current Contact Lens Rx #2 (Trial Lens, Dispensed, Ordered)     Right Acuvue Oasys MAX 1 Day MF 8.4 14.3 +1.50  MID      Left Acuvue Oasys MAX 1 Day MF 8.4 14.3 +2.00  HIGH          Current Contact Lens Rx #3 (Trial Lens, Ordered)     Right NaturalVue MF 1 Day 8.3 14.5 +1.50        Left NaturalVue MF 1 Day 8.3 14.5 +2.00          Quantity: 4 Package: TRIAL Appointment needed? No Medically necessary? No Ship To: [place] Additional instructions: Please order 4 trial packs of the Oasys Max MF for each eye and 2 trial packs per eye for the Natural Mildred MF (of note there is no add designation for the naturalvue- it's a universal add power). Pt can  from  when in, she has a follow up visit already scheduled. Thanks!

## 2024-11-13 NOTE — Clinical Note
Hello,  I forgot to write on this patient's out guide for her to schedule an appointment with Dr. Benavides for a blepharoplasty consult. Can you call her to see if she wants to schedule.  Thanks!

## 2024-11-18 ENCOUNTER — TELEPHONE (OUTPATIENT)
Dept: OPHTHALMOLOGY | Facility: CLINIC | Age: 64
End: 2024-11-18
Payer: COMMERCIAL

## 2024-11-18 NOTE — TELEPHONE ENCOUNTER
Tried reaching out to patient in regards to delay in start date with Dr. Benavides unable to l/m someone kept picking up phone but no response when saying hello

## 2024-11-20 ENCOUNTER — APPOINTMENT (OUTPATIENT)
Dept: RADIOLOGY | Facility: HOSPITAL | Age: 64
End: 2024-11-20
Payer: COMMERCIAL

## 2024-11-20 ENCOUNTER — HOSPITAL ENCOUNTER (OUTPATIENT)
Dept: RADIOLOGY | Facility: HOSPITAL | Age: 64
Discharge: HOME | End: 2024-11-20
Payer: COMMERCIAL

## 2024-11-20 DIAGNOSIS — M85.80 OSTEOPENIA, UNSPECIFIED LOCATION: ICD-10-CM

## 2024-11-20 PROCEDURE — 77080 DXA BONE DENSITY AXIAL: CPT

## 2024-11-20 PROCEDURE — 77080 DXA BONE DENSITY AXIAL: CPT | Performed by: RADIOLOGY

## 2024-11-22 ENCOUNTER — HOSPITAL ENCOUNTER (OUTPATIENT)
Dept: RADIOLOGY | Facility: HOSPITAL | Age: 64
Discharge: HOME | End: 2024-11-22
Payer: COMMERCIAL

## 2024-11-22 DIAGNOSIS — N95.0 POST-MENOPAUSAL BLEEDING: ICD-10-CM

## 2024-11-22 PROCEDURE — 76856 US EXAM PELVIC COMPLETE: CPT

## 2024-11-25 ENCOUNTER — APPOINTMENT (OUTPATIENT)
Dept: OPHTHALMOLOGY | Facility: CLINIC | Age: 64
End: 2024-11-25
Payer: COMMERCIAL

## 2024-12-03 DIAGNOSIS — Z86.19 HISTORY OF COLD SORES: ICD-10-CM

## 2024-12-03 DIAGNOSIS — J30.9 ALLERGIC RHINITIS, UNSPECIFIED SEASONALITY, UNSPECIFIED TRIGGER: ICD-10-CM

## 2024-12-03 DIAGNOSIS — N31.8 HYPERACTIVITY OF BLADDER: ICD-10-CM

## 2024-12-03 RX ORDER — MONTELUKAST SODIUM 10 MG/1
10 TABLET ORAL DAILY
Qty: 90 TABLET | Refills: 3 | Status: SHIPPED | OUTPATIENT
Start: 2024-12-03 | End: 2025-12-02

## 2024-12-03 RX ORDER — VALACYCLOVIR HYDROCHLORIDE 500 MG/1
500 TABLET, FILM COATED ORAL
Qty: 90 TABLET | Refills: 1 | Status: SHIPPED | OUTPATIENT
Start: 2024-12-03 | End: 2025-12-03

## 2024-12-03 RX ORDER — TOLTERODINE 4 MG/1
4 CAPSULE, EXTENDED RELEASE ORAL DAILY
Qty: 90 CAPSULE | Refills: 3 | Status: SHIPPED | OUTPATIENT
Start: 2024-12-03 | End: 2025-12-02

## 2024-12-10 ENCOUNTER — PHARMACY VISIT (OUTPATIENT)
Dept: PHARMACY | Facility: CLINIC | Age: 64
End: 2024-12-10
Payer: COMMERCIAL

## 2024-12-10 PROCEDURE — RXMED WILLOW AMBULATORY MEDICATION CHARGE

## 2024-12-18 ENCOUNTER — APPOINTMENT (OUTPATIENT)
Dept: OPHTHALMOLOGY | Facility: CLINIC | Age: 64
End: 2024-12-18
Payer: COMMERCIAL

## 2024-12-18 DIAGNOSIS — H52.4 PRESBYOPIA: ICD-10-CM

## 2024-12-18 DIAGNOSIS — H52.03 HYPERMETROPIA OF BOTH EYES: Primary | ICD-10-CM

## 2024-12-18 PROCEDURE — FCCLS CONTACT LENS F/U VISIT: Performed by: OPTOMETRIST

## 2024-12-18 ASSESSMENT — REFRACTION_CURRENTRX
OS_BASECURVE: 8.4
OD_BRAND: ACUVUE OASYS MAX 1 DAY MF
OD_BASECURVE: 8.4
OS_ADD: HIGH
OS_DIAMETER: 14.3
OD_BRAND: ACUVUE OASYS MAX 1 DAY MF
OS_BRAND: ACUVUE OASYS MAX 1 DAY MF
OD_BASECURVE: 8.4
OS_BRAND: ACUVUE OASYS MAX 1 DAY MF
OS_ADD: HIGH
OD_SPHERE: +1.25
OS_SPHERE: +2.25
OD_ADD: MID
OS_SPHERE: +2.00
OD_SPHERE: +1.50
OS_BASECURVE: 8.4
OD_DIAMETER: 14.3
OS_DIAMETER: 14.3
OD_ADD: MID
OD_DIAMETER: 14.3

## 2024-12-18 ASSESSMENT — VISUAL ACUITY: CORRECTION_TYPE: CONTACTS

## 2024-12-18 ASSESSMENT — ENCOUNTER SYMPTOMS
ENDOCRINE NEGATIVE: 0
ALLERGIC/IMMUNOLOGIC NEGATIVE: 0
PSYCHIATRIC NEGATIVE: 0
RESPIRATORY NEGATIVE: 0
EYES NEGATIVE: 1
NEUROLOGICAL NEGATIVE: 0
HEMATOLOGIC/LYMPHATIC NEGATIVE: 0
MUSCULOSKELETAL NEGATIVE: 0
CONSTITUTIONAL NEGATIVE: 0
GASTROINTESTINAL NEGATIVE: 0
CARDIOVASCULAR NEGATIVE: 0

## 2024-12-18 ASSESSMENT — EXTERNAL EXAM - RIGHT EYE: OD_EXAM: NORMAL

## 2024-12-18 ASSESSMENT — EXTERNAL EXAM - LEFT EYE: OS_EXAM: NORMAL

## 2024-12-18 NOTE — PROGRESS NOTES
Assessment/Plan   Diagnoses and all orders for this visit:  Hypermetropia of both eyes  Presbyopia  Discussed. Will order trials with adjusted holbrook, ok to finalize if patient happy with lenses. Discussed modified MF/monovision. Pt voiced understanding.

## 2024-12-18 NOTE — Clinical Note
Both eyes (OU) Contact Lens Order Contact Lens Current Rx     Current Contact Lens Rx #2 (Trial Lens, Dispensed, Ordered)      Brand Base Curve Diameter Sphere Add Dist VA Over-Sphere   Right Acuvue Oasys MAX 1 Day MF 8.4 14.3 +1.25 MID 20/20-1 -0.25   Left Acuvue Oasys MAX 1 Day MF 8.4 14.3 +2.25 HIGH 20/30-1 +0.25        Quantity: 2 Package: TRIAL Appointment needed? No Medically necessary? No Ship To: [place] Additional instructions: Please order 1 trial box for each eye, pt will  at . If she doesn't answer she requests to be left a message. She works at [employer] and can walk to the  clinic. Thanks!

## 2024-12-31 ENCOUNTER — APPOINTMENT (OUTPATIENT)
Dept: OPHTHALMOLOGY | Facility: CLINIC | Age: 64
End: 2024-12-31
Payer: COMMERCIAL

## 2025-01-03 ENCOUNTER — TELEPHONE (OUTPATIENT)
Dept: OBSTETRICS AND GYNECOLOGY | Facility: CLINIC | Age: 65
End: 2025-01-03
Payer: COMMERCIAL

## 2025-01-03 NOTE — TELEPHONE ENCOUNTER
Left message for pt to return call.  Per Dr. Zapata:  Osteopenia - she should make sure she is getting enough calcium and vitamin D in her diet and engage in weight bearing activity. She may consider medical management.  
negative...

## 2025-01-06 ENCOUNTER — TELEPHONE (OUTPATIENT)
Dept: OBSTETRICS AND GYNECOLOGY | Facility: CLINIC | Age: 65
End: 2025-01-06
Payer: COMMERCIAL

## 2025-01-06 NOTE — TELEPHONE ENCOUNTER
Called pt, no answer, left voicemail for return call if needed  Per Dr. Zapata: Osteopenia - she should make sure she is getting enough calcium and vitamin D in her diet and engage in weight bearing activity. She may consider medical management.   RN sent pt [employer] message with above information

## 2025-01-14 ENCOUNTER — LAB (OUTPATIENT)
Dept: LAB | Facility: LAB | Age: 65
End: 2025-01-14
Payer: COMMERCIAL

## 2025-01-14 DIAGNOSIS — M85.80 OSTEOPENIA, UNSPECIFIED LOCATION: Primary | ICD-10-CM

## 2025-01-14 DIAGNOSIS — M85.80 OSTEOPENIA, UNSPECIFIED LOCATION: ICD-10-CM

## 2025-01-14 LAB — 25(OH)D3 SERPL-MCNC: 31 NG/ML (ref 30–100)

## 2025-01-14 PROCEDURE — 82306 VITAMIN D 25 HYDROXY: CPT

## 2025-01-15 ENCOUNTER — TELEPHONE (OUTPATIENT)
Dept: OPHTHALMOLOGY | Facility: CLINIC | Age: 65
End: 2025-01-15
Payer: COMMERCIAL

## 2025-01-16 ENCOUNTER — TELEPHONE (OUTPATIENT)
Dept: OPHTHALMOLOGY | Facility: CLINIC | Age: 65
End: 2025-01-16
Payer: COMMERCIAL

## 2025-01-16 NOTE — TELEPHONE ENCOUNTER
Requested final copy of RX.  Messaged the  To finalize and the  to email.  Email address provided.

## 2025-01-17 ENCOUNTER — DOCUMENTATION (OUTPATIENT)
Dept: OPHTHALMOLOGY | Facility: CLINIC | Age: 65
End: 2025-01-17
Payer: COMMERCIAL

## 2025-01-17 ASSESSMENT — REFRACTION_CURRENTRX
OD_SPHERE: +1.25
OD_BASECURVE: 8.4
OD_BRAND: ACUVUE OASYS MAX 1 DAY MF
OD_ADD: MID
OS_BASECURVE: 8.4
OS_DIAMETER: 14.3
OD_DIAMETER: 14.3
OS_BRAND: ACUVUE OASYS MAX 1 DAY MF
OS_ADD: HIGH
OS_SPHERE: +2.25

## 2025-02-10 ENCOUNTER — APPOINTMENT (OUTPATIENT)
Dept: OPHTHALMOLOGY | Facility: CLINIC | Age: 65
End: 2025-02-10
Payer: COMMERCIAL

## 2025-02-10 ENCOUNTER — TELEPHONE (OUTPATIENT)
Dept: PRIMARY CARE | Facility: CLINIC | Age: 65
End: 2025-02-10

## 2025-02-10 DIAGNOSIS — J11.1 INFLUENZA: Primary | ICD-10-CM

## 2025-02-10 RX ORDER — OSELTAMIVIR PHOSPHATE 75 MG/1
75 CAPSULE ORAL 2 TIMES DAILY
Qty: 10 CAPSULE | Refills: 0 | Status: SHIPPED | OUTPATIENT
Start: 2025-02-10 | End: 2025-02-15

## 2025-02-10 NOTE — TELEPHONE ENCOUNTER
Patient woke up with the flu on Saturday, requesting rx for tamiflu  Please send to [facility] on [address] and [address]

## 2025-02-28 ENCOUNTER — APPOINTMENT (OUTPATIENT)
Dept: OPHTHALMOLOGY | Age: 65
End: 2025-02-28
Payer: COMMERCIAL

## 2025-02-28 DIAGNOSIS — H02.834 DERMATOCHALASIS OF BOTH UPPER EYELIDS: Primary | ICD-10-CM

## 2025-02-28 DIAGNOSIS — H02.831 DERMATOCHALASIS OF BOTH UPPER EYELIDS: Primary | ICD-10-CM

## 2025-02-28 DIAGNOSIS — H57.819 BROW PTOSIS: ICD-10-CM

## 2025-02-28 PROCEDURE — 92081 LIMITED VISUAL FIELD XM: CPT

## 2025-02-28 PROCEDURE — 99214 OFFICE O/P EST MOD 30 MIN: CPT

## 2025-02-28 ASSESSMENT — LEVATOR FUNCTION
OS_LEVATOR: 18
OD_LEVATOR: 18

## 2025-02-28 ASSESSMENT — VISUAL ACUITY
OD_CC: 20/25
OS_CC: 20/25
METHOD: SNELLEN - LINEAR
CORRECTION_TYPE: CONTACTS

## 2025-02-28 ASSESSMENT — ENCOUNTER SYMPTOMS
EYES NEGATIVE: 0
CARDIOVASCULAR NEGATIVE: 0
GASTROINTESTINAL NEGATIVE: 0
RESPIRATORY NEGATIVE: 0
HEMATOLOGIC/LYMPHATIC NEGATIVE: 0
NEUROLOGICAL NEGATIVE: 0
MUSCULOSKELETAL NEGATIVE: 0
ENDOCRINE NEGATIVE: 0
PSYCHIATRIC NEGATIVE: 0
CONSTITUTIONAL NEGATIVE: 0
ALLERGIC/IMMUNOLOGIC NEGATIVE: 0

## 2025-02-28 ASSESSMENT — MARGIN REFLEX DISTANCE
OD_MRD1: 4
OS_MRD1: 3

## 2025-02-28 ASSESSMENT — TONOMETRY
IOP_METHOD: TONOPEN
OS_IOP_MMHG: 13
OD_IOP_MMHG: 13

## 2025-02-28 NOTE — PROGRESS NOTES
Crystal Hall is a 64 y.o. 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[employer])  Reports experiencing progressively worsening upper eyelid droop over the past 2-3y  Symptoms include   Obscured superior visual field  Heaviness of eyes  Difficulty keeping eyes open  Trouble with procedures at work when looking down  Frequent need to manually lift eyelids  Denies   Trauma  Prior periocular surgeries, injectables, and laser  Denies   Diplopia  Vision loss  Smoking status: quit 9y ago        Pertinent Exam Findings  Dermatochalasis of the BILATERAL upper eyelids with hooding  MRD1 4 mm OD, 3 mm OS  PF 11 mm OD, 10 mm  OS  LF 18 mm OD, 18 mm OS  SC 8 mm OD, 8 mm OS  EOM full OU; orthophoric; PERRL with no APD and no anisocoria  Brow position slightly ptotic bilaterally     Ptosis VF Testing  There is a significant obstruction in both eyes, which improves significantly in both eyes after the eyelids are taped up.     Assessment/Plan  Dermatochalasis and mild ptosis of the BILATERAL upper eyelids. Ptosis is possibly mechanical, due to the dermatochalasis or from involutional changes. Discussed findings at length. If correction desired, recommended BILATERAL upper eyelid blepharoplasty. Patient understands there may be a need for ptosis repair in the future, but initially will treat only dermatochalasis.     Pt also has symmetric brow ptosis bilaterally, which is contributing to the eyelid droop. Recommended correction to help address eyelid droop, which may not improve with blepharoplasty alone due to brow ptosis. Discussed options of forehead lift vs direct brow lift vs browpexy concurrently with blepharoplasty via same incision. Pt wishes to proceed with browpexy bilaterally. Pt understands that browpexy does not provide as great of a lift as the other procedures described and that brow ptosis may recur with time, but she wishes to avoid the other surgical options discussed.     No guarantee of  result. Risks of surgery explained in detail including but not limited to infection, bleeding, lagophthalmos, worsening of SONU, asymmetry, scarring, wound dehiscence, loss of vision, double vision, cosmetic deformity, and need for postop readjustments. All questions answered after thorough discussion. The patient expressed good understanding and wishes to proceed with the recommended surgery. Informed consent obtained and photos taken (see Media tab or below). Case request placed with orders for PAT as well. Must avoid ASA products, NSAIDs, blood thinners because of risk of intraoperative orbital hemorrhage. Will schedule 1-week postop during checkout today.                   No blood thinners  No pacemaker  Doxycycline   No cancer hx    Ethel Benavides MD

## 2025-03-03 PROCEDURE — RXMED WILLOW AMBULATORY MEDICATION CHARGE

## 2025-03-07 ENCOUNTER — PHARMACY VISIT (OUTPATIENT)
Dept: PHARMACY | Facility: CLINIC | Age: 65
End: 2025-03-07
Payer: COMMERCIAL

## 2025-03-08 ASSESSMENT — CONF VISUAL FIELD
OD_INFERIOR_NASAL_RESTRICTION: 0
OS_INFERIOR_NASAL_RESTRICTION: 0
OD_SUPERIOR_NASAL_RESTRICTION: 0
OS_INFERIOR_TEMPORAL_RESTRICTION: 0
OD_NORMAL: 1
OS_NORMAL: 1
OS_SUPERIOR_NASAL_RESTRICTION: 0
OS_SUPERIOR_TEMPORAL_RESTRICTION: 0
OD_SUPERIOR_TEMPORAL_RESTRICTION: 0
OD_INFERIOR_TEMPORAL_RESTRICTION: 0

## 2025-03-08 ASSESSMENT — PUNCTA - ASSESSMENT
OD_PUNCTA: NORMAL
OS_PUNCTA: NORMAL

## 2025-04-04 ENCOUNTER — PRE-ADMISSION TESTING (OUTPATIENT)
Dept: PREADMISSION TESTING | Facility: HOSPITAL | Age: 65
End: 2025-04-04
Payer: COMMERCIAL

## 2025-04-04 VITALS
WEIGHT: 139.11 LBS | HEIGHT: 65 IN | SYSTOLIC BLOOD PRESSURE: 155 MMHG | HEART RATE: 74 BPM | RESPIRATION RATE: 18 BRPM | BODY MASS INDEX: 23.18 KG/M2 | OXYGEN SATURATION: 99 % | TEMPERATURE: 96.3 F | DIASTOLIC BLOOD PRESSURE: 94 MMHG

## 2025-04-04 DIAGNOSIS — Z01.818 PRE-OP TESTING: Primary | ICD-10-CM

## 2025-04-04 LAB
ANION GAP SERPL CALC-SCNC: 10 MMOL/L (ref 10–20)
BUN SERPL-MCNC: 20 MG/DL (ref 6–23)
CALCIUM SERPL-MCNC: 9.2 MG/DL (ref 8.6–10.3)
CHLORIDE SERPL-SCNC: 106 MMOL/L (ref 98–107)
CO2 SERPL-SCNC: 29 MMOL/L (ref 21–32)
CREAT SERPL-MCNC: 0.77 MG/DL (ref 0.5–1.05)
EGFRCR SERPLBLD CKD-EPI 2021: 86 ML/MIN/1.73M*2
GLUCOSE SERPL-MCNC: 97 MG/DL (ref 74–99)
POTASSIUM SERPL-SCNC: 4.3 MMOL/L (ref 3.5–5.3)
SODIUM SERPL-SCNC: 141 MMOL/L (ref 136–145)

## 2025-04-04 PROCEDURE — 99204 OFFICE O/P NEW MOD 45 MIN: CPT | Performed by: NURSE PRACTITIONER

## 2025-04-04 PROCEDURE — 36415 COLL VENOUS BLD VENIPUNCTURE: CPT

## 2025-04-04 PROCEDURE — 80048 BASIC METABOLIC PNL TOTAL CA: CPT

## 2025-04-04 RX ORDER — PSEUDOEPHEDRINE HCL 30 MG
30 TABLET ORAL EVERY 4 HOURS PRN
COMMUNITY

## 2025-04-04 RX ORDER — MINERAL OIL
180 ENEMA (ML) RECTAL DAILY
COMMUNITY

## 2025-04-04 ASSESSMENT — DUKE ACTIVITY SCORE INDEX (DASI)
TOTAL_SCORE: 58.2
CAN YOU CLIMB A FLIGHT OF STAIRS OR WALK UP A HILL: YES
DASI METS SCORE: 9.9
CAN YOU PARTICIPATE IN MODERATE RECREATIONAL ACTIVITIES LIKE GOLF, BOWLING, DANCING, DOUBLES TENNIS OR THROWING A BASEBALL OR FOOTBALL: YES
CAN YOU DO YARD WORK LIKE RAKING LEAVES, WEEDING OR PUSHING A MOWER: YES
CAN YOU DO LIGHT WORK AROUND THE HOUSE LIKE DUSTING OR WASHING DISHES: YES
CAN YOU DO MODERATE WORK AROUND THE HOUSE LIKE VACUUMING, SWEEPING FLOORS OR CARRYING GROCERIES: YES
CAN YOU DO HEAVY WORK AROUND THE HOUSE LIKE SCRUBBING FLOORS OR LIFTING AND MOVING HEAVY FURNITURE: YES
CAN YOU TAKE CARE OF YOURSELF (EAT, DRESS, BATHE, OR USE TOILET): YES
CAN YOU RUN A SHORT DISTANCE: YES
CAN YOU WALK INDOORS, SUCH AS AROUND YOUR HOUSE: YES
CAN YOU PARTICIPATE IN STRENOUS SPORTS LIKE SWIMMING, SINGLES TENNIS, FOOTBALL, BASKETBALL, OR SKIING: YES
CAN YOU HAVE SEXUAL RELATIONS: YES
CAN YOU WALK A BLOCK OR TWO ON LEVEL GROUND: YES

## 2025-04-04 ASSESSMENT — PAIN SCALES - GENERAL: PAINLEVEL_OUTOF10: 0 - NO PAIN

## 2025-04-04 ASSESSMENT — PAIN - FUNCTIONAL ASSESSMENT: PAIN_FUNCTIONAL_ASSESSMENT: 0-10

## 2025-04-04 NOTE — CPM/PAT H&P
CPM/PAT Evaluation       Name: Crystal Hall (Crystalvirgilio Barnettvon)  /Age: 1960/64 y.o.     In-Person   64 yr old female presents to PAT for pre-operative evaluation, with c/o BILATERAL BLEPHAROPLASTY  scheduled on 2025 with Dr. Ethel Benavides.    The patient has the following past medical history: L partial nephrectomy, allergy induced reactive airway, uterine fibroids    PCP: Dr. Sanjay Cook    Family Hx:  Mother: hypothyroidism, CAD, HTN, COPD  Father: Afib  Brother: HTN, CAD  Sister: HTN, hypothyroid  Brother 2: unremarkable  Brother 3: HTN  Sister 2: HTN, hypothyroid    Chief complaint:    Pt c/o obscured visual field, eyelids feeling heavy, difficulty keeping eyes open, and difficulty performing procedures at work when looking down.  States that she has had to manually lift her eyelids.  She endorses dry eyes.  She wears contacts and has difficulty putting contacts in with her condition.  She denies pain.     Denies fever, chills or nausea.   Denies any past issues with anesthesia.        Vitals:    25 1030   BP: (!) 155/94   Pulse: 74   Resp: 18   Temp: 35.7 °C (96.3 °F)   SpO2: 99%          Past Medical History:   Diagnosis Date    Abdominal pain 2023    Acute bronchitis 2023    Acute upper respiratory infection 2023    Allergic reaction to insect sting 2023    Asthmatic bronchitis ([facility]) 2023    Atopic dermatitis 2023    Benign paroxysmal positional vertigo of right ear 2023    Burning with urination 2023    C. difficile colitis 2023    Chronic cough 2023    Concussion 2023    Concussion with loss of consciousness status unknown, initial encounter 02/10/2015    Closed head injury with concussion    Dermatochalasis of eyelid of left eye 2023    Diarrhea 2023    Fibrocystic breast     Finger swelling 2023    Gastroenteritis 2023    Gluteal tendinitis of right buttock 2023    Hip pain, right  06/28/2023    Influenza-like syndrome 06/28/2023    Joint pain, elbow 06/28/2023    Other conditions influencing health status 12/22/2015    Complete Colonoscopy For Polyp Removal    Other lipoprotein metabolism disorders 12/22/2015    Elevated HDL    Other specified health status 10/07/2018    False positive cardiac stress test    Other specified postprocedural states 10/07/2018    Status post cardiac catheterization    Personal history of other diseases of the female genital tract 02/10/2015    History of endometriosis    Personal history of other diseases of the musculoskeletal system and connective tissue 02/10/2015    History of osteoporosis    Personal history of other diseases of the respiratory system 07/19/2013    History of sinusitis    Personal history of other infectious and parasitic diseases 11/14/2022    History of Clostridioides difficile colitis    Piriformis syndrome of right side 06/28/2023    Post-concussion headache 06/28/2023    Reactive airway disease ([facility])     Rib pain on left side 06/28/2023    Sinusitis     Sinusitis, acute 06/28/2023    Strain of flexor muscle of hip 06/28/2023    Urinary tract infection 06/28/2023    Vision loss        Past Surgical History:   Procedure Laterality Date    CARDIAC CATHETERIZATION      COLONOSCOPY      COLONOSCOPY W/ POLYPECTOMY  04/16/2013    Complete Colonoscopy For Polyp Removal    NEPHRECTOMY Left     partial left nephjrectomy    OTHER SURGICAL HISTORY  07/19/2013    Nephrectomy    OTHER SURGICAL HISTORY  07/19/2013    Uterine Myomectomy    TONSILLECTOMY  07/19/2013    Tonsillectomy    UPPER GASTROINTESTINAL ENDOSCOPY         Family History   Problem Relation Name Age of Onset    Stroke Mother      Other (HTN) Mother      Hypothyroidism Mother      Atrial fibrillation Father      Hypothyroidism Sister      Other (HTN) Sister      Hypothyroidism Sister      Other (HTN) Brother         Allergies   Allergen Reactions    Doxycycline Other    Other  Unknown       Prior to Admission medications    Medication Sig Start Date End Date Taking? Authorizing Provider   albuterol (Ventolin HFA) 90 mcg/actuation inhaler INHALE 1-2 PUFFS BY MOUTH EVERY 4 TO 6 HOURS AS NEEDED 5/8/24 5/8/25  Sanjay Cook MD   loratadine-pseudoephedrine (Claritin-D 12 Hour) 5-120 mg 12 hr tablet Take 1 tablet by mouth every 12 hours if needed. 1/13/15   Historical Provider, MD   montelukast (Singulair) 10 mg tablet Take 1 tablet by mouth once daily. 12/3/24 12/2/25  Sanjay Cook MD   polyethylene glycol-electrolytes (Golytely) 420 gram solution Begin drinking first half of prep 6pm the night before procedure. Start second half 5 hours before procedure time. 5/2/24   Boyd Hanley MD   tolterodine LA (Detrol LA) 4 mg 24 hr capsule TAKE 1 CAPSULE BY MOUTH ONCE DAILY 12/3/24 12/2/25  Sanjay Cook MD   valACYclovir (Valtrex) 500 mg tablet Take 1 tablet (500 mg) by mouth once daily. 12/3/24 12/3/25  Sanjay Cook MD        Review of Systems  Constitutional: NO F, chills, or sweats  Eyes: no blurred vision or visual disturbance, wears glasses/contacts  ENT: has nasal congestion d/t allergies, sore throat, difficulty hearing  Cardiovascular: no chest pain, no edema, no palps and no syncope.   Respiratory: no cough,no s.o.b. and no wheezing  Gastrointestinal: no abdominal pain, no N/V, no blood in stools  Genitourinary: no dysuria, no urinary frequency, no urinary hesitancy, endorses urinary urgency and spasms without medication, Nocturia x1  Musculoskeletal: no arthralgias,  no back pain and no myalgias.   Integumentary: no new skin lesions and no rashes.   Neurological: no difficulty walking, no headache, no limb weakness, no numbness and no tingling.   Endocrine: no recent weight gain and no recent weight loss.   Hematologic/Lymphatic: no tendency for easy bruising and no swollen glands.      Physical Exam  Vitals reviewed.   Eyes:      Comments: Bilateral ptosis   Cardiovascular:       "Rate and Rhythm: Normal rate and regular rhythm.   Pulmonary:      Effort: Pulmonary effort is normal.      Breath sounds: Normal breath sounds.   Abdominal:      General: Bowel sounds are normal.      Palpations: Abdomen is soft.   Skin:     General: Skin is warm and dry.   Neurological:      Mental Status: She is oriented to person, place, and time.          PAT AIRWAY:   Airway:     Mallampati::  II    TM distance::  >3 FB    Neck ROM::  Full      Visit Vitals  BP (!) 155/94   Pulse 74   Temp 35.7 °C (96.3 °F) (Tympanic)   Resp 18   Ht 1.651 m (5' 5\")   Wt 63.1 kg (139 lb 1.8 oz)   SpO2 99%   BMI 23.15 kg/m²   OB Status Postmenopausal   Smoking Status Former   BSA 1.7 m²       DASI Risk Score      Flowsheet Row Pre-Admission Testing from 4/4/2025 in [facility] Questionnaire Series Submission from 3/24/2025 in [facility] with Generic Provider Fredy   Can you take care of yourself (eat, dress, bathe, or use toilet)?  2.75 filed at 04/04/2025 1023 2.75  filed at 03/24/2025 1155   Can you walk indoors, such as around your house? 1.75 filed at 04/04/2025 1023 1.75  filed at 03/24/2025 1155   Can you walk a block or two on level ground?  2.75 filed at 04/04/2025 1023 2.75  filed at 03/24/2025 1155   Can you climb a flight of stairs or walk up a hill? 5.5 filed at 04/04/2025 1023 5.5  filed at 03/24/2025 1155   Can you run a short distance? 8 filed at 04/04/2025 1023 8  filed at 03/24/2025 1155   Can you do light work around the house like dusting or washing dishes? 2.7 filed at 04/04/2025 1023 2.7  filed at 03/24/2025 1155   Can you do moderate work around the house like vacuuming, sweeping floors or carrying groceries? 3.5 filed at 04/04/2025 1023 3.5  filed at 03/24/2025 1155   Can you do heavy work around the house like scrubbing floors or lifting and moving heavy furniture?  8 filed at 04/04/2025 1023 8  filed at 03/24/2025 1155   Can you do yard work like raking leaves, weeding or pushing a mower? " 4.5 filed at 04/04/2025 1023 4.5  filed at 03/24/2025 1155   Can you have sexual relations? 5.25 filed at 04/04/2025 1023 5.25  filed at 03/24/2025 1155   Can you participate in moderate recreational activities like golf, bowling, dancing, doubles tennis or throwing a baseball or football? 6 filed at 04/04/2025 1023 6  filed at 03/24/2025 1155   Can you participate in strenous sports like swimming, singles tennis, football, basketball, or skiing? 7.5 filed at 04/04/2025 1023 7.5  filed at 03/24/2025 1155   DASI SCORE 58.2 filed at 04/04/2025 1023 58.2  filed at 03/24/2025 1155   METS Score (Will be calculated only when all the questions are answered) 9.9 filed at 04/04/2025 1023 9.9  filed at 03/24/2025 1155          Caprini DVT Assessment    No data to display       Modified Frailty Index    No data to display       [id number] Stroke Risk Points  Current as of just now        N/A 0 to 9 Points:      Last Change: N/A          The [id number] risk score (Lip GH, et al. 2009. © 2010 American [employer]) quantifies the risk of stroke for a patient with atrial fibrillation. For patients without atrial fibrillation or under the age of 18 this score appears as N/A. Higher score values generally indicate higher risk of stroke.        This score is not applicable to this patient. Components are not calculated.          Revised Cardiac Risk Index    No data to display       Apfel Simplified Score    No data to display       Risk Analysis Index Results This Encounter    No data found in the last 10 encounters.       Stop Bang Score      Flowsheet Row Pre-Admission Testing from 4/4/2025 in [facility] Questionnaire Series Submission from 3/24/2025 in [facility] with Generic Provider Fredy   Do you snore loudly? 1 filed at 04/04/2025 1041 1  filed at 03/24/2025 1155   Do you often feel tired or fatigued after your sleep? 0 filed at 04/04/2025 1041 0  filed at 03/24/2025 1155   Has anyone ever  observed you stop breathing in your sleep? 1 filed at 04/04/2025 1041 1  filed at 03/24/2025 1155   Do you have or are you being treated for high blood pressure? 0 filed at 04/04/2025 1041 0  filed at 03/24/2025 1155   Recent BMI (Calculated) 23.2 filed at 04/04/2025 1041 22.2  filed at 03/24/2025 1155   Is BMI greater than 35 kg/m2? 0=No filed at 04/04/2025 1041 0=No  filed at 03/24/2025 1155   Age older than 50 years old? 1=Yes filed at 04/04/2025 1041 1=Yes  filed at 03/24/2025 1155   Is your neck circumference greater than 17 inches (Male) or 16 inches (Female)? 0 filed at 04/04/2025 1041 --   Gender - Male 0=No filed at 04/04/2025 1041 0=No  filed at 03/24/2025 1155   STOP-BANG Total Score 3 filed at 04/04/2025 1041 --          Prodigy: High Risk  Total Score: 8              Prodigy Age Score           ARISCAT Score for Postoperative Pulmonary Complications    No data to display       Diggs Perioperative Risk for Myocardial Infarction or Cardiac Arrest (KASSANDRA)    No data to display       I was present with the APRN student who participated in the documentation of this note. I have personally seen and re-examined the patient and performed the medical decision-making components (assessment,  plan of care, pre-op teaching, and holding medications). I have reviewed the APRN student documentation and verified the findings in the note as written with additions or exceptions as stated in the body of this note.      ASSESSMENT    Sinus/allergies  Takes OTC sinus medications, singulair, inhaler, PRN    OAB  Takes Detrol LA    Bilateral Ptosis/ dermatochalasis  C/o work difficulties r/t heavy eyelids and decreased visual field  Uses contacts and glasses, c/o dry eyes  Plan for Blepharoplasty as scheduled      ANESTHESIA FINDINGS:  Intubation History: No history of difficult intubation  Significant Anesthesia Considerations:      Airway History: No abnormal airway history  Predictors of Difficult Airway Management  ? STOP  BANG -3, + snoring     CONSULTS:    Patient does not require consults for optimization at this time.     The Following Tests/Procedures Have Been Initiated and Reviewed/ interpreted by me:   MAMI    Planned Anesthetic: MAC     Instructions Given to Patient:  *Reviewed Medications to be taken in AM of surgery or held  Patient given verbal and written preop instructions and voices comprehension and compliance.     No further testing required.      PLAN  This patient is optimally prepared for surgery.

## 2025-04-04 NOTE — PREPROCEDURE INSTRUCTIONS
Medication List            Accurate as of April 4, 2025 11:17 AM. Always use your most recent med list.                fexofenadine 180 mg tablet  Commonly known as: Allegra  Medication Adjustments for Surgery: Do Not take on the morning of surgery     GaviLyte-G 236-22.74-6.74 -5.86 gram solution  Generic drug: polyethylene glycol  Begin drinking first half of prep 6pm the night before procedure. Start second half 5 hours before procedure time.  Medication Adjustments for Surgery: Do Not take on the morning of surgery     montelukast 10 mg tablet  Commonly known as: Singulair  Take 1 tablet by mouth once daily.  Medication Adjustments for Surgery: Take/Use as prescribed     pseudoephedrine 30 mg tablet  Commonly known as: Sudafed  Medication Adjustments for Surgery: Do Not take on the morning of surgery     tolterodine LA 4 mg 24 hr capsule  Commonly known as: Detrol LA  TAKE 1 CAPSULE BY MOUTH ONCE DAILY  Medication Adjustments for Surgery: Take/Use as prescribed     valACYclovir 500 mg tablet  Commonly known as: Valtrex  Take 1 tablet (500 mg) by mouth once daily.  Medication Adjustments for Surgery: Take/Use as prescribed     Ventolin HFA 90 mcg/actuation inhaler  Generic drug: albuterol  INHALE 1-2 PUFFS BY MOUTH EVERY 4 TO 6 HOURS AS NEEDED  Medication Adjustments for Surgery: Take/Use as prescribed              PRE-OPERATIVE INSTRUCTIONS FOR SURGERY    *Do not eat anything after midnight the night of surgery.  This includes food of any kind (including hard candy, cough drops, mints).     You may have up to 13 ounces of clear liquid  until TWO hours prior to your scheduled surgery time  Clear liquids include water, black tea/coffee, (no milk or cream) apple juice and electrolyte drinks (GATORADE).  You may chew gum until TWO hours prior you your surgery/procedure.       *IF you are ORDERED the ERAS protocol: follow as instructed.  DO not drink an additional 13 ounces as noted above.    -----------    *One  Afternoon Update to Morning H&P:  Ostomy bag was able to be attached this morning and patient was grateful to allow his skin some time for healing.   He reports he has been unable to do this successfully at home.     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    If you have not received a call by 2 pm, call [phone]    *When you arrive at the hospital-->GO TO Registration on the ground floor    *Stop smoking 24 hours prior to surgery.  No Marijuana, CBD Oil or Vaping for 48 hours    *No alcohol 24 hours prior to surgery    *You will need a responsible adult to drive you home    -No acrylic nails or nail polish on at least one fingernail, NO polish on toes for foot surgery    -You may be asked to remove your dentures, partial plate, eyeglasses or contact lenses before going to surgery.  Please bring a case for these items.    -Body piercings need to be removed.  Jewelry and valuables should be left at home.    -Put on loose,  comfortable, clean clothing, that will accommodate bandages    *If you have any further questions about your pre-op instructions,  not mentioned in this handout, then call [phone]*    What you may be asked to bring to surgery:  Insurance info and photo ID                           NPO Instructions:    Do not eat any food after midnight the night before your surgery/procedure.  You may have clear liquids until TWO hours before surgery/procedure. This includes water, black tea/coffee, (no milk or cream) apple juice and electrolyte drinks (Gatorade).    Additional Instructions:     Day of Surgery:  Review your medication instructions, take indicated medications  You may have clear liquids until TWO hours before surgery/procedure.  This includes water, black tea/coffee, (no milk or cream) apple juice and electrolyte drinks (Gatorade)  Wear  comfortable loose fitting clothing  Do not use moisturizers, creams, lotions or perfume  All jewelry and valuables should be left at home

## 2025-04-11 ENCOUNTER — ANESTHESIA (OUTPATIENT)
Dept: OPERATING ROOM | Facility: HOSPITAL | Age: 65
End: 2025-04-11
Payer: COMMERCIAL

## 2025-04-11 ENCOUNTER — ANESTHESIA EVENT (OUTPATIENT)
Dept: OPERATING ROOM | Facility: HOSPITAL | Age: 65
End: 2025-04-11
Payer: COMMERCIAL

## 2025-04-11 ENCOUNTER — HOSPITAL ENCOUNTER (OUTPATIENT)
Facility: HOSPITAL | Age: 65
Setting detail: OUTPATIENT SURGERY
Discharge: HOME | End: 2025-04-11
Payer: COMMERCIAL

## 2025-04-11 ENCOUNTER — PHARMACY VISIT (OUTPATIENT)
Dept: PHARMACY | Facility: CLINIC | Age: 65
End: 2025-04-11
Payer: COMMERCIAL

## 2025-04-11 VITALS
DIASTOLIC BLOOD PRESSURE: 84 MMHG | HEART RATE: 74 BPM | SYSTOLIC BLOOD PRESSURE: 172 MMHG | OXYGEN SATURATION: 99 % | RESPIRATION RATE: 16 BRPM | TEMPERATURE: 96.8 F

## 2025-04-11 DIAGNOSIS — H02.831 DERMATOCHALASIS OF BOTH UPPER EYELIDS: Primary | ICD-10-CM

## 2025-04-11 DIAGNOSIS — H02.834 DERMATOCHALASIS OF BOTH UPPER EYELIDS: Primary | ICD-10-CM

## 2025-04-11 DIAGNOSIS — H57.819 BROW PTOSIS: ICD-10-CM

## 2025-04-11 LAB
HBV SURFACE AG SERPL QL IA: NONREACTIVE
HCV AB SER QL: NONREACTIVE
HIV 1+2 AB+HIV1 P24 AG SERPL QL IA: NONREACTIVE

## 2025-04-11 PROCEDURE — 3700000002 HC GENERAL ANESTHESIA TIME - EACH INCREMENTAL 1 MINUTE

## 2025-04-11 PROCEDURE — 36415 COLL VENOUS BLD VENIPUNCTURE: CPT

## 2025-04-11 PROCEDURE — 87340 HEPATITIS B SURFACE AG IA: CPT | Mod: PARLAB

## 2025-04-11 PROCEDURE — 2500000004 HC RX 250 GENERAL PHARMACY W/ HCPCS (ALT 636 FOR OP/ED)

## 2025-04-11 PROCEDURE — 2500000005 HC RX 250 GENERAL PHARMACY W/O HCPCS

## 2025-04-11 PROCEDURE — 7100000010 HC PHASE TWO TIME - EACH INCREMENTAL 1 MINUTE

## 2025-04-11 PROCEDURE — 15823 BLEPHARP UPR EYELID XCSV SKN: CPT

## 2025-04-11 PROCEDURE — 3600000008 HC OR TIME - EACH INCREMENTAL 1 MINUTE - PROCEDURE LEVEL THREE

## 2025-04-11 PROCEDURE — 3700000001 HC GENERAL ANESTHESIA TIME - INITIAL BASE CHARGE

## 2025-04-11 PROCEDURE — 2720000007 HC OR 272 NO HCPCS

## 2025-04-11 PROCEDURE — RXMED WILLOW AMBULATORY MEDICATION CHARGE

## 2025-04-11 PROCEDURE — 2500000004 HC RX 250 GENERAL PHARMACY W/ HCPCS (ALT 636 FOR OP/ED): Performed by: NURSE ANESTHETIST, CERTIFIED REGISTERED

## 2025-04-11 PROCEDURE — 67900 REPAIR BROW DEFECT: CPT

## 2025-04-11 PROCEDURE — 3600000003 HC OR TIME - INITIAL BASE CHARGE - PROCEDURE LEVEL THREE

## 2025-04-11 PROCEDURE — 2500000001 HC RX 250 WO HCPCS SELF ADMINISTERED DRUGS (ALT 637 FOR MEDICARE OP): Performed by: ANESTHESIOLOGY

## 2025-04-11 PROCEDURE — 7100000009 HC PHASE TWO TIME - INITIAL BASE CHARGE

## 2025-04-11 PROCEDURE — 87389 HIV-1 AG W/HIV-1&-2 AB AG IA: CPT | Mod: PARLAB

## 2025-04-11 PROCEDURE — 86803 HEPATITIS C AB TEST: CPT | Mod: PARLAB

## 2025-04-11 RX ORDER — ACETAMINOPHEN 325 MG/1
650 TABLET ORAL EVERY 4 HOURS PRN
Status: DISCONTINUED | OUTPATIENT
Start: 2025-04-11 | End: 2025-04-11 | Stop reason: HOSPADM

## 2025-04-11 RX ORDER — TETRACAINE HYDROCHLORIDE 5 MG/ML
SOLUTION OPHTHALMIC AS NEEDED
Status: DISCONTINUED | OUTPATIENT
Start: 2025-04-11 | End: 2025-04-11 | Stop reason: HOSPADM

## 2025-04-11 RX ORDER — WATER 1 ML/ML
INJECTION IRRIGATION AS NEEDED
Status: DISCONTINUED | OUTPATIENT
Start: 2025-04-11 | End: 2025-04-11 | Stop reason: HOSPADM

## 2025-04-11 RX ORDER — MIDAZOLAM HYDROCHLORIDE 1 MG/ML
INJECTION, SOLUTION INTRAMUSCULAR; INTRAVENOUS AS NEEDED
Status: DISCONTINUED | OUTPATIENT
Start: 2025-04-11 | End: 2025-04-11

## 2025-04-11 RX ORDER — ERYTHROMYCIN 5 MG/G
OINTMENT OPHTHALMIC 2 TIMES DAILY
Qty: 3.5 G | Refills: 1 | Status: SHIPPED | OUTPATIENT
Start: 2025-04-11

## 2025-04-11 RX ORDER — PROPOFOL 10 MG/ML
INJECTION, EMULSION INTRAVENOUS AS NEEDED
Status: DISCONTINUED | OUTPATIENT
Start: 2025-04-11 | End: 2025-04-11

## 2025-04-11 RX ORDER — ONDANSETRON HYDROCHLORIDE 2 MG/ML
4 INJECTION, SOLUTION INTRAVENOUS ONCE AS NEEDED
Status: DISCONTINUED | OUTPATIENT
Start: 2025-04-11 | End: 2025-04-11 | Stop reason: HOSPADM

## 2025-04-11 RX ORDER — MEPERIDINE HYDROCHLORIDE 50 MG/ML
12.5 INJECTION INTRAMUSCULAR; INTRAVENOUS; SUBCUTANEOUS EVERY 10 MIN PRN
Status: DISCONTINUED | OUTPATIENT
Start: 2025-04-11 | End: 2025-04-11 | Stop reason: HOSPADM

## 2025-04-11 RX ORDER — LIDOCAINE HYDROCHLORIDE AND EPINEPHRINE 10; 20 UG/ML; MG/ML
INJECTION, SOLUTION INFILTRATION; PERINEURAL AS NEEDED
Status: DISCONTINUED | OUTPATIENT
Start: 2025-04-11 | End: 2025-04-11 | Stop reason: HOSPADM

## 2025-04-11 RX ORDER — DEXAMETHASONE SODIUM PHOSPHATE 10 MG/ML
6 INJECTION INTRAMUSCULAR; INTRAVENOUS ONCE
Status: DISCONTINUED | OUTPATIENT
Start: 2025-04-11 | End: 2025-04-11 | Stop reason: HOSPADM

## 2025-04-11 RX ORDER — ALBUTEROL SULFATE 0.83 MG/ML
2.5 SOLUTION RESPIRATORY (INHALATION) ONCE AS NEEDED
Status: DISCONTINUED | OUTPATIENT
Start: 2025-04-11 | End: 2025-04-11 | Stop reason: HOSPADM

## 2025-04-11 RX ORDER — BACITRACIN ZINC 500 UNIT/G
OINTMENT (GRAM) TOPICAL 2 TIMES DAILY
Qty: 28 G | Refills: 0 | Status: SHIPPED | OUTPATIENT
Start: 2025-04-11 | End: 2025-04-11 | Stop reason: HOSPADM

## 2025-04-11 RX ADMIN — PROPOFOL 20 MG: 10 INJECTION, EMULSION INTRAVENOUS at 11:14

## 2025-04-11 RX ADMIN — PROPOFOL 20 MG: 10 INJECTION, EMULSION INTRAVENOUS at 11:20

## 2025-04-11 RX ADMIN — PROPOFOL 20 MG: 10 INJECTION, EMULSION INTRAVENOUS at 10:35

## 2025-04-11 RX ADMIN — PROPOFOL 20 MG: 10 INJECTION, EMULSION INTRAVENOUS at 10:48

## 2025-04-11 RX ADMIN — PROPOFOL 75 MCG/KG/MIN: 10 INJECTION, EMULSION INTRAVENOUS at 10:12

## 2025-04-11 RX ADMIN — PROPOFOL 10 MG: 10 INJECTION, EMULSION INTRAVENOUS at 11:04

## 2025-04-11 RX ADMIN — MIDAZOLAM 2 MG: 1 INJECTION INTRAMUSCULAR; INTRAVENOUS at 10:49

## 2025-04-11 RX ADMIN — PROPOFOL 20 MG: 10 INJECTION, EMULSION INTRAVENOUS at 11:07

## 2025-04-11 RX ADMIN — PROPOFOL 20 MG: 10 INJECTION, EMULSION INTRAVENOUS at 11:21

## 2025-04-11 RX ADMIN — PROPOFOL 30 MG: 10 INJECTION, EMULSION INTRAVENOUS at 10:19

## 2025-04-11 RX ADMIN — PROPOFOL 25 MG: 10 INJECTION, EMULSION INTRAVENOUS at 11:33

## 2025-04-11 RX ADMIN — PROPOFOL 30 MG: 10 INJECTION, EMULSION INTRAVENOUS at 10:40

## 2025-04-11 RX ADMIN — ACETAMINOPHEN 650 MG: 325 TABLET, FILM COATED ORAL at 12:18

## 2025-04-11 RX ADMIN — PROPOFOL 20 MG: 10 INJECTION, EMULSION INTRAVENOUS at 11:30

## 2025-04-11 RX ADMIN — MIDAZOLAM 2 MG: 1 INJECTION INTRAMUSCULAR; INTRAVENOUS at 10:11

## 2025-04-11 RX ADMIN — PROPOFOL 20 MG: 10 INJECTION, EMULSION INTRAVENOUS at 11:37

## 2025-04-11 RX ADMIN — SODIUM CHLORIDE: 9 INJECTION, SOLUTION INTRAVENOUS at 10:11

## 2025-04-11 RX ADMIN — PROPOFOL 30 MG: 10 INJECTION, EMULSION INTRAVENOUS at 10:21

## 2025-04-11 RX ADMIN — PROPOFOL 20 MG: 10 INJECTION, EMULSION INTRAVENOUS at 10:30

## 2025-04-11 SDOH — HEALTH STABILITY: MENTAL HEALTH: CURRENT SMOKER: 0

## 2025-04-11 ASSESSMENT — COLUMBIA-SUICIDE SEVERITY RATING SCALE - C-SSRS
2. HAVE YOU ACTUALLY HAD ANY THOUGHTS OF KILLING YOURSELF?: NO
6. HAVE YOU EVER DONE ANYTHING, STARTED TO DO ANYTHING, OR PREPARED TO DO ANYTHING TO END YOUR LIFE?: NO
1. IN THE PAST MONTH, HAVE YOU WISHED YOU WERE DEAD OR WISHED YOU COULD GO TO SLEEP AND NOT WAKE UP?: NO

## 2025-04-11 ASSESSMENT — PAIN DESCRIPTION - ORIENTATION: ORIENTATION: LEFT;RIGHT

## 2025-04-11 ASSESSMENT — PAIN SCALES - GENERAL
PAINLEVEL_OUTOF10: 0 - NO PAIN
PAINLEVEL_OUTOF10: 2
PAINLEVEL_OUTOF10: 4
PAINLEVEL_OUTOF10: 2
PAINLEVEL_OUTOF10: 3
PAIN_LEVEL: 0

## 2025-04-11 ASSESSMENT — PAIN - FUNCTIONAL ASSESSMENT
PAIN_FUNCTIONAL_ASSESSMENT: 0-10

## 2025-04-11 ASSESSMENT — PAIN DESCRIPTION - LOCATION: LOCATION: EYE

## 2025-04-11 ASSESSMENT — PAIN DESCRIPTION - DESCRIPTORS: DESCRIPTORS: SORE;SHARP

## 2025-04-11 NOTE — ANESTHESIA PREPROCEDURE EVALUATION
Patient: Crystal Hall    Procedure Information       Date/Time: 04/11/25 0900    Procedure: BILATERAL BLEPHAROPLASTY (Bilateral)    Location: [facility] / [facility]    Surgeons: Ethel Benavides MD            Relevant Problems   Anesthesia (within normal limits)      Cardiac   (+) Atypical chest pain   (+) Coronary artery arteriosclerosis      Musculoskeletal   (+) Stenosis, cervical spine       Clinical information reviewed:   Tobacco  Allergies  Meds   Med Hx  Surg Hx  OB Status   Soc Hx        NPO Detail:  NPO/Void Status  Carbohydrate Drink Given Prior to Surgery? : N  Date of Last Liquid: 04/10/25  Time of Last Liquid: 1900  Date of Last Solid: 04/10/25  Time of Last Solid: 1800  Last Intake Type: Solid meal  Time of Last Void: 0942         Physical Exam    Airway  Mallampati: II  TM distance: <3 FB  Neck ROM: full     Cardiovascular - normal exam     Dental - normal exam     Pulmonary - normal exam     Abdominal - normal exam             Anesthesia Plan    History of general anesthesia?: yes  History of complications of general anesthesia?: no    ASA 2     MAC     The patient is not a current smoker.    intravenous induction   Anesthetic plan and risks discussed with patient.    Plan discussed with CRNA.

## 2025-04-11 NOTE — DISCHARGE INSTRUCTIONS
Please start Bacitracin ointment 2 times daily over both upper eyelids   Ice pacs to both upper eyelids 20 mins every 1 hour for first 48 hours   No heavy lifting for 2 weeks Keep head of the bed elevated for first 48 hours   May only body shower starting tomorrow   Avoid swimming/hot tubs for 2 weeks    May have Tylenol

## 2025-04-11 NOTE — H&P
History Of Present Illness  Crystal Hall is a 64 y.o. female presenting with Dermatochalasis and mild ptosis of the BILATERAL upper eyelids here for planned Bilateral upper eyelid blepharoplasty and bilateral browpexy.     Past Medical History  Past Medical History:   Diagnosis Date    Abdominal pain 06/28/2023    Acute bronchitis 06/28/2023    Acute upper respiratory infection 06/28/2023    Allergic reaction to insect sting 06/28/2023    Asthmatic bronchitis ([facility]-H[facility]) 06/28/2023    Atopic dermatitis 06/28/2023    Benign paroxysmal positional vertigo of right ear 06/28/2023    Burning with urination 06/28/2023    C. difficile colitis 06/28/2023    Chronic cough 06/28/2023    Concussion 06/28/2023    Concussion with loss of consciousness status unknown, initial encounter 02/10/2015    Closed head injury with concussion    Dermatochalasis of eyelid of left eye 06/28/2023    Diarrhea 06/28/2023    Fibrocystic breast     Finger swelling 06/28/2023    Gastroenteritis 06/28/2023    Gluteal tendinitis of right buttock 06/28/2023    Hip pain, right 06/28/2023    Influenza-like syndrome 06/28/2023    Joint pain, elbow 06/28/2023    Other conditions influencing health status 12/22/2015    Complete Colonoscopy For Polyp Removal    Other lipoprotein metabolism disorders 12/22/2015    Elevated HDL    Other specified health status 10/07/2018    False positive cardiac stress test    Other specified postprocedural states 10/07/2018    Status post cardiac catheterization    Personal history of other diseases of the female genital tract 02/10/2015    History of endometriosis    Personal history of other diseases of the musculoskeletal system and connective tissue 02/10/2015    History of osteoporosis    Personal history of other diseases of the respiratory system 07/19/2013    History of sinusitis    Personal history of other infectious and parasitic diseases 11/14/2022    History of Clostridioides difficile colitis    Piriformis  syndrome of right side 06/28/2023    Post-concussion headache 06/28/2023    Reactive airway disease ([facility])     Rib pain on left side 06/28/2023    Sinusitis     Sinusitis, acute 06/28/2023    Strain of flexor muscle of hip 06/28/2023    Urinary tract infection 06/28/2023    Vision loss        Surgical History  Past Surgical History:   Procedure Laterality Date    CARDIAC CATHETERIZATION      COLONOSCOPY      COLONOSCOPY W/ POLYPECTOMY  04/16/2013    Complete Colonoscopy For Polyp Removal    NEPHRECTOMY Left     partial left nephjrectomy    OTHER SURGICAL HISTORY  07/19/2013    Nephrectomy    OTHER SURGICAL HISTORY  07/19/2013    Uterine Myomectomy    TONSILLECTOMY  07/19/2013    Tonsillectomy    UPPER GASTROINTESTINAL ENDOSCOPY          Social History  She reports that she quit smoking about 9 years ago. Her smoking use included cigarettes. She has never been exposed to tobacco smoke. She has never used smokeless tobacco. No history on file for alcohol use and drug use.    Family History  Family History   Problem Relation Name Age of Onset    Stroke Mother      Other (HTN) Mother      Hypothyroidism Mother      Atrial fibrillation Father      Hypothyroidism Sister      Other (HTN) Sister      Hypothyroidism Sister      Other (HTN) Brother          Allergies  Doxycycline and Other    Review of Systems   All other systems reviewed and are negative.       Physical Exam   Physical exam:  Head: normocephalic  Eyes: see clinic note  Respiratory: per anesthesia  Cardiovascular: per anesthesia  Neuro: alert and interactive     Last Recorded Vitals  Pulse 80, temperature 36.2 °C (97.2 °F), temperature source Temporal, resp. rate 18, SpO2 99%.         Assessment/Plan   Assessment & Plan  Dermatochalasis of both upper eyelids    Brow ptosis      Crystal Hall is a 64 y.o. female presenting with Dermatochalasis and mild ptosis of the BILATERAL upper eyelids here for planned Bilateral upper eyelid blepharoplasty and  bilateral browpexy.    Proceed with plan as above           Regina Mars MD

## 2025-04-12 NOTE — OP NOTE
BILATERAL BLEPHAROPLASTY (B) Operative Note     Date: 2025  OR Location: [facility]    Name: Crystal Hall, : 1960, Age: 64 y.o., MRN: [id number], Sex: female    Diagnosis  Pre-op Diagnosis      * Dermatochalasis of both upper eyelids [H02.831, H02.834]     * Brow ptosis [H57.819] Post-op Diagnosis     * Dermatochalasis of both upper eyelids [H02.831, H02.834]     * Brow ptosis [H57.819]     Procedures  BILATERAL BLEPHAROPLASTY  79807 - [facility] BLEPHAROPLASTY UPPER EYELID W/EXCESSIVE SKIN    BILATERAL BLEPHAROPLASTY  44669 - VA REPAIR BROW PTOSIS      Surgeons      * Ethel Benavides - Primary    Resident/Fellow/Other Assistant:  Regina Mars MD (Resident, Assisting)    Staff:   Circulator: Kitty Jones Person: Saba  Surgical Assistant: Leigh Cramer Circulator: Breana    Anesthesia Staff: Anesthesiologist: Cady Lopez MD  CRNA: JOSE E Downing-CRNA  SRNA: Pinky Irvin    Procedure Summary  Anesthesia: Monitor Anesthesia Care  ASA: II  Estimated Blood Loss: 10mL  Intra-op Medications:   Administrations occurring from 0900 to 1030 on 25:   Medication Name Total Dose   lidocaine-epinephrine (Xylocaine W/EPI) 2 %-1:100,000 injection 6 mL   sterile water irrigation solution 500 mL   tetracaine (Altacaine) 0.5 % ophthalmic solution 2 drop   midazolam (Versed) 1 mg/1 mL 2 mg   50 mL propofol 10mg/mL 152.57 mg   NaCl 0.9 % bolus Cannot be calculated              Anesthesia Record               Intraprocedure I/O Totals          Intake    NaCl 0.9 % bolus 175.00 mL    Propofol Drip 0.00 mL    The total shown is the total volume documented since Anesthesia Start was filed.    Total Intake 175 mL          Specimen: No specimens collected      Drains and/or Catheters: * None in log *    Findings: bilateral upper eyelid dermatochalasis and bilateral brow ptosis    Indications: Crystal Hall is an 64 y.o. female who is having surgery for Dermatochalasis of both upper eyelids [H02.831, H02.834]  Brow  ptosis [H57.819].     The patient was seen in the preoperative area. The risks, benefits, complications, treatment options, non-operative alternatives, expected recovery and outcomes were discussed with the patient. The possibilities of reaction to medication, pulmonary aspiration, injury to surrounding structures, bleeding, recurrent infection, the need for additional procedures, failure to diagnose a condition, and creating a complication requiring transfusion or operation were discussed with the patient. The patient concurred with the proposed plan, giving informed consent.  The site of surgery was properly noted/marked if necessary per policy. The patient has been actively warmed in preoperative area. Preoperative antibiotics are not indicated. Venous thrombosis prophylaxis are not indicated.    Procedure Details:   The patient was brought to the operative suite and identified as the correct patient. Proparacaine drops were placed in both eyes. The patient was placed in a supine position. The skin crease and excess skin were conservatively marked on each side with a skin marking pen using a skin pinch technique with toothless forceps. Care was taken to ensure that at least 20 mm of skin remained after excision to minimize the risk of lagophthalmos. The patient was prepped and draped in the usual sterile fashion. Under IV sedation, an injection of a solution of 8:1 2% lidocaine with epinephrine 1:100,000 and tranexamic acid (100mg/mL) was given in both upper lids and brow regions.     Attention was first directed to the right upper eyelid blepharoplasty. An incision was made over the skin marks of the right upper eyelid using microdissection needle tipped cautery. Skin and orbicularis were then excised using a [place] Microdissection Needle. Hemostasis was achieved with bipolar cautery. The medial fat pad was identified and mobilized. The fat pad was then conservatively excised first by clamping the proposed  excision amount with a hemostat, followed by monopolar cutting cautery with the Microdissection Needle. An identical procedure was performed on the contralateral side with identical findings.     Attention was then turned to the right browpexy. Cutting cautery was used to dissect superiorly between the orbital septum and orbicularis muscle, beneath the brow fat, to the superior orbital rim. Dissection continued superior to the superior orbital rim in a preperiosteal plane. Hemostasis was achieved with bipolar cautery. Calipers were used to measure 1.2 cm superior to the superior orbital rim at the level of the peak of the brow. A 5-0 prolene suture was placed through the periosteum at this marking. The calipers are then used to measure 1.2 cm superior to the superior border of the incision and the 5-0 prolene suture was placed at this marking through the muscle layer. The brow was elevated and was noted to be in good position. An identical procedure was performed on the contralateral side with identical findings.     At this juncture, careful examination of the surgical area confirmed that there was no evidence of active bleeding. The upper eyelid incisions were then closed using 6-0 prolene suture in a running fashion.      At the closure of the surgery, there was no evidence of active bleeding or excessive swelling. The scleral shells were removed. Ointment was placed over the eyelids and over the eyes. The patient tolerated the procedure well and was taken to the recovery room in stable condition.     Complications:  None; patient tolerated the procedure well.    Disposition: PACU - hemodynamically stable.  Condition: stable   Attending Attestation: I was present and scrubbed for the entire procedure.    Ethel Benavides  Phone Number: [phone]

## 2025-04-14 ASSESSMENT — PAIN SCALES - GENERAL: PAINLEVEL_OUTOF10: 0 - NO PAIN

## 2025-04-25 ENCOUNTER — APPOINTMENT (OUTPATIENT)
Dept: OPHTHALMOLOGY | Age: 65
End: 2025-04-25
Payer: COMMERCIAL

## 2025-04-28 PROCEDURE — RXMED WILLOW AMBULATORY MEDICATION CHARGE

## 2025-04-30 ENCOUNTER — APPOINTMENT (OUTPATIENT)
Dept: OBSTETRICS AND GYNECOLOGY | Facility: CLINIC | Age: 65
End: 2025-04-30
Payer: COMMERCIAL

## 2025-04-30 ENCOUNTER — OFFICE VISIT (OUTPATIENT)
Dept: OPHTHALMOLOGY | Facility: CLINIC | Age: 65
End: 2025-04-30
Payer: COMMERCIAL

## 2025-04-30 VITALS
HEIGHT: 65 IN | WEIGHT: 139.4 LBS | BODY MASS INDEX: 23.22 KG/M2 | SYSTOLIC BLOOD PRESSURE: 140 MMHG | DIASTOLIC BLOOD PRESSURE: 82 MMHG

## 2025-04-30 DIAGNOSIS — H02.834 DERMATOCHALASIS OF BOTH UPPER EYELIDS: Primary | ICD-10-CM

## 2025-04-30 DIAGNOSIS — Z01.419 WELL WOMAN EXAM: ICD-10-CM

## 2025-04-30 DIAGNOSIS — H57.819 BROW PTOSIS: ICD-10-CM

## 2025-04-30 DIAGNOSIS — H02.831 DERMATOCHALASIS OF BOTH UPPER EYELIDS: Primary | ICD-10-CM

## 2025-04-30 PROCEDURE — 1159F MED LIST DOCD IN RCRD: CPT | Performed by: OBSTETRICS & GYNECOLOGY

## 2025-04-30 PROCEDURE — 1126F AMNT PAIN NOTED NONE PRSNT: CPT | Performed by: OBSTETRICS & GYNECOLOGY

## 2025-04-30 PROCEDURE — 99397 PER PM REEVAL EST PAT 65+ YR: CPT | Performed by: OBSTETRICS & GYNECOLOGY

## 2025-04-30 PROCEDURE — 1036F TOBACCO NON-USER: CPT | Performed by: OBSTETRICS & GYNECOLOGY

## 2025-04-30 PROCEDURE — 3008F BODY MASS INDEX DOCD: CPT | Performed by: OBSTETRICS & GYNECOLOGY

## 2025-04-30 PROCEDURE — 99024 POSTOP FOLLOW-UP VISIT: CPT

## 2025-04-30 ASSESSMENT — VISUAL ACUITY
OS_CC+: +2
OS_PH_CC: 20/30
METHOD: SNELLEN - LINEAR
CORRECTION_TYPE: CONTACTS
OD_CC: 20/30
OS_PH_CC+: +1
OS_CC: 20/80
OD_CC+: +1

## 2025-04-30 ASSESSMENT — TONOMETRY
IOP_METHOD: TONOPEN
OD_IOP_MMHG: 14
OS_IOP_MMHG: 15

## 2025-04-30 ASSESSMENT — EXTERNAL EXAM - RIGHT EYE: OD_EXAM: NORMAL

## 2025-04-30 ASSESSMENT — PUNCTA - ASSESSMENT
OD_PUNCTA: NORMAL
OS_PUNCTA: NORMAL

## 2025-04-30 ASSESSMENT — PAIN SCALES - GENERAL: PAINLEVEL_OUTOF10: 0-NO PAIN

## 2025-04-30 ASSESSMENT — MARGIN REFLEX DISTANCE
OD_MRD1: 4
OS_MRD1: 4

## 2025-04-30 ASSESSMENT — EXTERNAL EXAM - LEFT EYE: OS_EXAM: NORMAL

## 2025-04-30 ASSESSMENT — LEVATOR FUNCTION
OD_LEVATOR: 18
OS_LEVATOR: 18

## 2025-04-30 NOTE — PROGRESS NOTES
64-year-old G0 postmenopausal woman presents today for annual GYN exam.  She has a history of SONU exposure.    She is using Detrol for bladder control.    She plans to retire in July 2025!    Objective   Physical Exam  Exam conducted with a chaperone present.   Constitutional:       Appearance: She is normal weight.   HENT:      Head: Normocephalic.      Right Ear: External ear normal.      Left Ear: External ear normal.      Nose: Nose normal.      Mouth/Throat:      Mouth: Mucous membranes are moist.   Eyes:      Extraocular Movements: Extraocular movements intact.      Pupils: Pupils are equal, round, and reactive to light.   Cardiovascular:      Rate and Rhythm: Normal rate and regular rhythm.      Heart sounds: Normal heart sounds.   Pulmonary:      Effort: Pulmonary effort is normal.      Breath sounds: Normal breath sounds.   Chest:   Breasts:     Right: Normal.      Left: Normal.   Abdominal:      Palpations: Abdomen is soft.   Genitourinary:     General: Normal vulva.      Labia:         Right: No rash.         Left: No rash.       Vagina: Normal.      Cervix: Normal. No cervical motion tenderness.      Uterus: Normal. Not deviated and not tender.       Adnexa: Right adnexa normal and left adnexa normal.   Musculoskeletal:      Cervical back: Normal range of motion and neck supple.   Skin:     General: Skin is warm and dry.   Neurological:      General: No focal deficit present.      Mental Status: She is alert and oriented to person, place, and time.   Psychiatric:         Mood and Affect: Mood normal.         Behavior: Behavior normal.     A/P: APE    -  Pap due 2026    -  Mammogram     -  PCP F/U     -  DEXA Q 2-3 years

## 2025-04-30 NOTE — PROGRESS NOTES
S/p POW2 BUL Blepharoplasty and brow ptosis repair (4/11/25)    Patient is very happy with the outcome.  Still using ointment BID  Mild tenderness to palpation    No sign of infection or wound dehisence  No lagophthalmos  Incisions well healed  Cornea normal      Sutures removed today.  No restrictions  Follow up in 3 months.

## 2025-04-30 NOTE — PATIENT INSTRUCTIONS
Congratulations on your FDC!    Thanks for coming in today for your annual GYN exam.       Your 2023 Pap was within normal limits with negative HPV testing.  Your next Pap smear is due in 2026.  However, please return to the office once a year for your annual GYN exam.       Arrange to have a mammogram performed once a year.       Arrange to have a DEXA/bone density scan performed every 2 to 3 years.       Follow-up with your PCP and other healthcare specialist as needed.       Feel free to call the office with any problems, questions or concerns prior to your next scheduled visit.

## 2025-05-05 ENCOUNTER — PHARMACY VISIT (OUTPATIENT)
Dept: PHARMACY | Facility: CLINIC | Age: 65
End: 2025-05-05
Payer: COMMERCIAL

## 2025-05-21 DIAGNOSIS — Z86.19 HISTORY OF COLD SORES: ICD-10-CM

## 2025-05-21 PROCEDURE — RXMED WILLOW AMBULATORY MEDICATION CHARGE

## 2025-05-22 ENCOUNTER — APPOINTMENT (OUTPATIENT)
Dept: RADIOLOGY | Facility: HOSPITAL | Age: 65
End: 2025-05-22
Payer: COMMERCIAL

## 2025-05-22 DIAGNOSIS — Z12.31 ENCOUNTER FOR SCREENING MAMMOGRAM FOR MALIGNANT NEOPLASM OF BREAST: ICD-10-CM

## 2025-05-22 DIAGNOSIS — Z01.419 WELL WOMAN EXAM: ICD-10-CM

## 2025-05-22 PROCEDURE — 77063 BREAST TOMOSYNTHESIS BI: CPT | Performed by: RADIOLOGY

## 2025-05-22 PROCEDURE — 77067 SCR MAMMO BI INCL CAD: CPT

## 2025-05-22 PROCEDURE — 77067 SCR MAMMO BI INCL CAD: CPT | Performed by: RADIOLOGY

## 2025-05-22 PROCEDURE — RXMED WILLOW AMBULATORY MEDICATION CHARGE

## 2025-05-22 RX ORDER — VALACYCLOVIR HYDROCHLORIDE 500 MG/1
500 TABLET, FILM COATED ORAL
Qty: 90 TABLET | Refills: 1 | Status: SHIPPED | OUTPATIENT
Start: 2025-05-22 | End: 2026-05-22

## 2025-05-23 ENCOUNTER — PHARMACY VISIT (OUTPATIENT)
Dept: PHARMACY | Facility: CLINIC | Age: 65
End: 2025-05-23
Payer: COMMERCIAL

## 2025-06-03 ENCOUNTER — PHARMACY VISIT (OUTPATIENT)
Dept: PHARMACY | Facility: CLINIC | Age: 65
End: 2025-06-03
Payer: COMMERCIAL

## 2025-07-10 ENCOUNTER — TELEPHONE (OUTPATIENT)
Dept: OPHTHALMOLOGY | Facility: CLINIC | Age: 65
End: 2025-07-10
Payer: COMMERCIAL

## 2025-07-11 ENCOUNTER — TELEPHONE (OUTPATIENT)
Dept: OPHTHALMOLOGY | Facility: CLINIC | Age: 65
End: 2025-07-11

## 2025-07-17 ENCOUNTER — OFFICE VISIT (OUTPATIENT)
Dept: OPHTHALMOLOGY | Facility: CLINIC | Age: 65
End: 2025-07-17
Payer: COMMERCIAL

## 2025-07-17 DIAGNOSIS — H02.831 DERMATOCHALASIS OF BOTH UPPER EYELIDS: Primary | ICD-10-CM

## 2025-07-17 DIAGNOSIS — H02.834 DERMATOCHALASIS OF BOTH UPPER EYELIDS: Primary | ICD-10-CM

## 2025-07-17 PROCEDURE — 99024 POSTOP FOLLOW-UP VISIT: CPT

## 2025-07-17 ASSESSMENT — VISUAL ACUITY
CORRECTION_TYPE: CONTACTS
OD_CC: 20/20
OS_CC: 20/25
METHOD: SNELLEN - LINEAR

## 2025-07-17 ASSESSMENT — ENCOUNTER SYMPTOMS
GASTROINTESTINAL NEGATIVE: 0
EYES NEGATIVE: 1
ENDOCRINE NEGATIVE: 0
CARDIOVASCULAR NEGATIVE: 0
NEUROLOGICAL NEGATIVE: 0
ALLERGIC/IMMUNOLOGIC NEGATIVE: 0
CONSTITUTIONAL NEGATIVE: 0
MUSCULOSKELETAL NEGATIVE: 0
RESPIRATORY NEGATIVE: 0
HEMATOLOGIC/LYMPHATIC NEGATIVE: 0
PSYCHIATRIC NEGATIVE: 0

## 2025-07-17 ASSESSMENT — CONF VISUAL FIELD
OS_SUPERIOR_TEMPORAL_RESTRICTION: 0
OS_INFERIOR_NASAL_RESTRICTION: 0
OD_SUPERIOR_NASAL_RESTRICTION: 0
OD_NORMAL: 1
OD_INFERIOR_TEMPORAL_RESTRICTION: 0
OS_SUPERIOR_NASAL_RESTRICTION: 0
OD_INFERIOR_NASAL_RESTRICTION: 0
OS_INFERIOR_TEMPORAL_RESTRICTION: 0
OD_SUPERIOR_TEMPORAL_RESTRICTION: 0
OS_NORMAL: 1

## 2025-07-17 ASSESSMENT — TONOMETRY: IOP_METHOD: GOLDMANN APPLANATION

## 2025-07-17 ASSESSMENT — EXTERNAL EXAM - LEFT EYE: OS_EXAM: NORMAL

## 2025-07-17 ASSESSMENT — EXTERNAL EXAM - RIGHT EYE: OD_EXAM: NORMAL

## 2025-07-17 NOTE — PROGRESS NOTES
"66 y/o female status post (s/p) BUL blepharoplasty and brow ptosis repair (4/11/25) presenting for follow up.    Patient states she developed two small cystic lesions, one on each eye near the medial canthus. OD stye appears like a \"pimple,\" and OS has improved considerably with warm compresses for 3 weeks.  In regard to blepharoplasty, patient is overall pleased, however does not feel left eye closes completely. Feels left eye is dry. Denies blurry vision, diplopia.  Current ocular regimen: none    Assessment/Plan  #right upper eyelid (RUL), Left medial canthal lesions  - 2 lesions; 0.5x0.5mm in size  - right upper eyelid (RUL) lesion appears most c/w epidermal inclusion cyst  - Left medial canthal lesion appears most c/w cyst  - No concerning features such as madarosis, telangiectasia, ulceration  - Discussed medical vs surgical management of lesions. Patient prefers to continue warm compresses and declines erythromycin nadia.    #status post (s/p) BUL blepharoplasty and brow ptosis repair (4/11/25)   - Exam without tenderness to palpation of BUL, no sign of infection  - Tr-1mm lagophthalmos OS  - Patient educated that lagophthalmos should improve with time, however if not, could address with surgical intervention at next visit  - PFAT QID for dry eye  - Follow up in 6 months    POM3        Preop      "

## 2025-07-23 PROCEDURE — RXMED WILLOW AMBULATORY MEDICATION CHARGE

## 2025-07-24 PROCEDURE — RXMED WILLOW AMBULATORY MEDICATION CHARGE

## 2025-07-25 ENCOUNTER — PHARMACY VISIT (OUTPATIENT)
Dept: PHARMACY | Facility: CLINIC | Age: 65
End: 2025-07-25
Payer: COMMERCIAL

## 2025-07-28 ENCOUNTER — PHARMACY VISIT (OUTPATIENT)
Dept: PHARMACY | Facility: CLINIC | Age: 65
End: 2025-07-28
Payer: COMMERCIAL

## 2025-07-30 ENCOUNTER — APPOINTMENT (OUTPATIENT)
Dept: OPHTHALMOLOGY | Facility: CLINIC | Age: 65
End: 2025-07-30
Payer: COMMERCIAL

## 2026-01-20 ENCOUNTER — APPOINTMENT (OUTPATIENT)
Dept: OPHTHALMOLOGY | Facility: CLINIC | Age: 66
End: 2026-01-20
Payer: COMMERCIAL

## 2026-01-21 ENCOUNTER — APPOINTMENT (OUTPATIENT)
Dept: OPHTHALMOLOGY | Facility: CLINIC | Age: 66
End: 2026-01-21
Payer: COMMERCIAL

## 2026-05-06 ENCOUNTER — APPOINTMENT (OUTPATIENT)
Dept: OBSTETRICS AND GYNECOLOGY | Facility: CLINIC | Age: 66
End: 2026-05-06
Payer: COMMERCIAL

## (undated) DEVICE — SYRINGE, 10 CC, LUER LOCK

## (undated) DEVICE — MARKER, SKIN, REGULAR TIP, W/FLEXI-RULER

## (undated) DEVICE — Device

## (undated) DEVICE — ELECTRODE, ELECTROSURGICAL, COATED NEEDLE, EDGE, STERILE

## (undated) DEVICE — OINTMENT, TOPICAL, BACITRACIN

## (undated) DEVICE — APPLICATOR, COTTON TIP, 6 IN, 2PK, STERILE

## (undated) DEVICE — FORCEP, BIOPOLAR, ADSON, NON INSUL, 5IN 1.0MM

## (undated) DEVICE — TIP, SUCTION, FRAZIER, 10 FR, DISP

## (undated) DEVICE — SLEEVE, VASO PRESS, CALF GARMENT, MEDIUM, GREEN

## (undated) DEVICE — PREP TRAY, SKIN, DRY, W/GLOVES